# Patient Record
Sex: MALE | Race: BLACK OR AFRICAN AMERICAN
[De-identification: names, ages, dates, MRNs, and addresses within clinical notes are randomized per-mention and may not be internally consistent; named-entity substitution may affect disease eponyms.]

---

## 2018-05-08 ENCOUNTER — HOSPITAL ENCOUNTER (INPATIENT)
Dept: HOSPITAL 92 - ERS | Age: 78
LOS: 14 days | Discharge: HOME HEALTH | DRG: 673 | End: 2018-05-22
Attending: FAMILY MEDICINE | Admitting: FAMILY MEDICINE
Payer: COMMERCIAL

## 2018-05-08 VITALS — BODY MASS INDEX: 19.8 KG/M2

## 2018-05-08 DIAGNOSIS — I10: ICD-10-CM

## 2018-05-08 DIAGNOSIS — R65.20: ICD-10-CM

## 2018-05-08 DIAGNOSIS — K52.9: ICD-10-CM

## 2018-05-08 DIAGNOSIS — N17.9: ICD-10-CM

## 2018-05-08 DIAGNOSIS — B18.1: ICD-10-CM

## 2018-05-08 DIAGNOSIS — R53.81: ICD-10-CM

## 2018-05-08 DIAGNOSIS — F12.10: ICD-10-CM

## 2018-05-08 DIAGNOSIS — H54.7: ICD-10-CM

## 2018-05-08 DIAGNOSIS — K82.9: ICD-10-CM

## 2018-05-08 DIAGNOSIS — E43: ICD-10-CM

## 2018-05-08 DIAGNOSIS — Z86.73: ICD-10-CM

## 2018-05-08 DIAGNOSIS — T83.511A: Primary | ICD-10-CM

## 2018-05-08 DIAGNOSIS — I26.99: ICD-10-CM

## 2018-05-08 DIAGNOSIS — F10.10: ICD-10-CM

## 2018-05-08 DIAGNOSIS — A41.9: ICD-10-CM

## 2018-05-08 DIAGNOSIS — E83.51: ICD-10-CM

## 2018-05-08 DIAGNOSIS — E83.42: ICD-10-CM

## 2018-05-08 DIAGNOSIS — E87.6: ICD-10-CM

## 2018-05-08 DIAGNOSIS — Z74.01: ICD-10-CM

## 2018-05-08 DIAGNOSIS — K74.60: ICD-10-CM

## 2018-05-08 DIAGNOSIS — D62: ICD-10-CM

## 2018-05-08 DIAGNOSIS — B96.89: ICD-10-CM

## 2018-05-08 DIAGNOSIS — N40.0: ICD-10-CM

## 2018-05-08 DIAGNOSIS — N39.0: ICD-10-CM

## 2018-05-08 DIAGNOSIS — R74.0: ICD-10-CM

## 2018-05-08 DIAGNOSIS — D69.6: ICD-10-CM

## 2018-05-08 DIAGNOSIS — E46: ICD-10-CM

## 2018-05-08 DIAGNOSIS — J44.9: ICD-10-CM

## 2018-05-08 DIAGNOSIS — J96.11: ICD-10-CM

## 2018-05-08 DIAGNOSIS — I24.8: ICD-10-CM

## 2018-05-08 DIAGNOSIS — R60.9: ICD-10-CM

## 2018-05-08 DIAGNOSIS — K66.1: ICD-10-CM

## 2018-05-08 LAB
ALBUMIN SERPL BCG-MCNC: 2.4 G/DL (ref 3.4–4.8)
ALP SERPL-CCNC: 51 U/L (ref 40–150)
ALT SERPL W P-5'-P-CCNC: 80 U/L (ref 8–55)
ANION GAP SERPL CALC-SCNC: 14 MMOL/L (ref 10–20)
APTT PPP: 43.5 SEC (ref 22.9–36.1)
AST SERPL-CCNC: 544 U/L (ref 5–34)
BACTERIA UR QL AUTO: (no result) HPF
BILIRUB SERPL-MCNC: 1.6 MG/DL (ref 0.2–1.2)
BUN SERPL-MCNC: 17 MG/DL (ref 8.4–25.7)
CALCIUM SERPL-MCNC: 7 MG/DL (ref 7.8–10.44)
CHLORIDE SERPL-SCNC: 103 MMOL/L (ref 98–107)
CO2 SERPL-SCNC: 23 MMOL/L (ref 23–31)
CREAT CL PREDICTED SERPL C-G-VRATE: 0 ML/MIN (ref 70–130)
CRYSTAL-AUWI FLAG: 41.8 (ref 0–15)
GLOBULIN SER CALC-MCNC: 2.2 G/DL (ref 2.4–3.5)
GLUCOSE SERPL-MCNC: 65 MG/DL (ref 83–110)
HEP C INDEX: 0.2 S/CO (ref 0–0.79)
HEV IGM SER QL: 4.5 (ref 0–7.99)
HGB BLD-MCNC: 11.6 G/DL (ref 14–18)
HIV 1/2 INDEX: 0.16 S/CO (ref ?–1)
HYALINE CASTS #/AREA URNS LPF: (no result) LPF
INR PPP: 1.6
MACROCYTES BLD QL SMEAR: (no result) (100X)
MCH RBC QN AUTO: 38.4 PG (ref 27–31)
MCV RBC AUTO: 112 FL (ref 80–94)
MDIFF COMPLETE?: YES
PATHC CAST-AUWI FLAG: 0 (ref 0–2.49)
PLATELET # BLD AUTO: 60 THOU/UL (ref 130–400)
PLATELET BLD QL SMEAR: (no result)
POLYCHROMASIA BLD QL SMEAR: (no result) (100X)
POTASSIUM SERPL-SCNC: 2.9 MMOL/L (ref 3.5–5.1)
PROT UR STRIP.AUTO-MCNC: 100 MG/DL
PROTHROMBIN TIME: 19.2 SEC (ref 12–14.7)
RBC # BLD AUTO: 3.03 MILL/UL (ref 4.7–6.1)
SODIUM SERPL-SCNC: 137 MMOL/L (ref 136–145)
SP GR UR STRIP: 1.01 (ref 1–1.04)
SPERM-AUWI FLAG: 0 (ref 0–9.9)
SYPHILIS ANTIBODY INDEX: 0.08 S/CO
TROPONIN I SERPL DL<=0.01 NG/ML-MCNC: 0.28 NG/ML (ref ?–0.03)
WBC # BLD AUTO: 15.7 THOU/UL (ref 4.8–10.8)
YEAST-AUWI FLAG: 78.2 (ref 0–25)

## 2018-05-08 PROCEDURE — 86900 BLOOD TYPING SEROLOGIC ABO: CPT

## 2018-05-08 PROCEDURE — 76942 ECHO GUIDE FOR BIOPSY: CPT

## 2018-05-08 PROCEDURE — 87149 DNA/RNA DIRECT PROBE: CPT

## 2018-05-08 PROCEDURE — 86850 RBC ANTIBODY SCREEN: CPT

## 2018-05-08 PROCEDURE — 86780 TREPONEMA PALLIDUM: CPT

## 2018-05-08 PROCEDURE — 96366 THER/PROPH/DIAG IV INF ADDON: CPT

## 2018-05-08 PROCEDURE — 84300 ASSAY OF URINE SODIUM: CPT

## 2018-05-08 PROCEDURE — 74177 CT ABD & PELVIS W/CONTRAST: CPT

## 2018-05-08 PROCEDURE — 85018 HEMOGLOBIN: CPT

## 2018-05-08 PROCEDURE — 85025 COMPLETE CBC W/AUTO DIFF WBC: CPT

## 2018-05-08 PROCEDURE — 82747 ASSAY OF FOLIC ACID RBC: CPT

## 2018-05-08 PROCEDURE — 86706 HEP B SURFACE ANTIBODY: CPT

## 2018-05-08 PROCEDURE — 83735 ASSAY OF MAGNESIUM: CPT

## 2018-05-08 PROCEDURE — 85379 FIBRIN DEGRADATION QUANT: CPT

## 2018-05-08 PROCEDURE — 76705 ECHO EXAM OF ABDOMEN: CPT

## 2018-05-08 PROCEDURE — 86901 BLOOD TYPING SEROLOGIC RH(D): CPT

## 2018-05-08 PROCEDURE — 93306 TTE W/DOPPLER COMPLETE: CPT

## 2018-05-08 PROCEDURE — 96365 THER/PROPH/DIAG IV INF INIT: CPT

## 2018-05-08 PROCEDURE — 85049 AUTOMATED PLATELET COUNT: CPT

## 2018-05-08 PROCEDURE — 80053 COMPREHEN METABOLIC PANEL: CPT

## 2018-05-08 PROCEDURE — 82805 BLOOD GASES W/O2 SATURATION: CPT

## 2018-05-08 PROCEDURE — 36430 TRANSFUSION BLD/BLD COMPNT: CPT

## 2018-05-08 PROCEDURE — 83880 ASSAY OF NATRIURETIC PEPTIDE: CPT

## 2018-05-08 PROCEDURE — 74183 MRI ABD W/O CNTR FLWD CNTR: CPT

## 2018-05-08 PROCEDURE — C1769 GUIDE WIRE: HCPCS

## 2018-05-08 PROCEDURE — 86803 HEPATITIS C AB TEST: CPT

## 2018-05-08 PROCEDURE — 74176 CT ABD & PELVIS W/O CONTRAST: CPT

## 2018-05-08 PROCEDURE — 87521 HEPATITIS C PROBE&RVRS TRNSC: CPT

## 2018-05-08 PROCEDURE — 96361 HYDRATE IV INFUSION ADD-ON: CPT

## 2018-05-08 PROCEDURE — 93010 ELECTROCARDIOGRAM REPORT: CPT

## 2018-05-08 PROCEDURE — 82607 VITAMIN B-12: CPT

## 2018-05-08 PROCEDURE — 83605 ASSAY OF LACTIC ACID: CPT

## 2018-05-08 PROCEDURE — 93005 ELECTROCARDIOGRAM TRACING: CPT

## 2018-05-08 PROCEDURE — 86705 HEP B CORE ANTIBODY IGM: CPT

## 2018-05-08 PROCEDURE — 81001 URINALYSIS AUTO W/SCOPE: CPT

## 2018-05-08 PROCEDURE — 87077 CULTURE AEROBIC IDENTIFY: CPT

## 2018-05-08 PROCEDURE — 86708 HEPATITIS A ANTIBODY: CPT

## 2018-05-08 PROCEDURE — 36416 COLLJ CAPILLARY BLOOD SPEC: CPT

## 2018-05-08 PROCEDURE — 71045 X-RAY EXAM CHEST 1 VIEW: CPT

## 2018-05-08 PROCEDURE — 94640 AIRWAY INHALATION TREATMENT: CPT

## 2018-05-08 PROCEDURE — 87517 HEPATITIS B DNA QUANT: CPT

## 2018-05-08 PROCEDURE — P9016 RBC LEUKOCYTES REDUCED: HCPCS

## 2018-05-08 PROCEDURE — 82570 ASSAY OF URINE CREATININE: CPT

## 2018-05-08 PROCEDURE — 71275 CT ANGIOGRAPHY CHEST: CPT

## 2018-05-08 PROCEDURE — 80202 ASSAY OF VANCOMYCIN: CPT

## 2018-05-08 PROCEDURE — 84484 ASSAY OF TROPONIN QUANT: CPT

## 2018-05-08 PROCEDURE — 37191 INS ENDOVAS VENA CAVA FILTR: CPT

## 2018-05-08 PROCEDURE — 82553 CREATINE MB FRACTION: CPT

## 2018-05-08 PROCEDURE — 85014 HEMATOCRIT: CPT

## 2018-05-08 PROCEDURE — 87350 HEPATITIS BE AG IA: CPT

## 2018-05-08 PROCEDURE — 93970 EXTREMITY STUDY: CPT

## 2018-05-08 PROCEDURE — A4216 STERILE WATER/SALINE, 10 ML: HCPCS

## 2018-05-08 PROCEDURE — 86704 HEP B CORE ANTIBODY TOTAL: CPT

## 2018-05-08 PROCEDURE — 87040 BLOOD CULTURE FOR BACTERIA: CPT

## 2018-05-08 PROCEDURE — 85730 THROMBOPLASTIN TIME PARTIAL: CPT

## 2018-05-08 PROCEDURE — 96367 TX/PROPH/DG ADDL SEQ IV INF: CPT

## 2018-05-08 PROCEDURE — 85610 PROTHROMBIN TIME: CPT

## 2018-05-08 PROCEDURE — 87186 SC STD MICRODIL/AGAR DIL: CPT

## 2018-05-08 PROCEDURE — 36415 COLL VENOUS BLD VENIPUNCTURE: CPT

## 2018-05-08 PROCEDURE — 87389 HIV-1 AG W/HIV-1&-2 AB AG IA: CPT

## 2018-05-08 PROCEDURE — 87340 HEPATITIS B SURFACE AG IA: CPT

## 2018-05-08 PROCEDURE — 87086 URINE CULTURE/COLONY COUNT: CPT

## 2018-05-08 PROCEDURE — 80048 BASIC METABOLIC PNL TOTAL CA: CPT

## 2018-05-08 NOTE — RAD
SINGLE VIEW OF THE CHEST:

 

Comparison: None. 

 

History: Hematuria, fever. 

 

FINDINGS: 

Single view of the chest shows a normal sized cardiomediastinal silhouette with atherosclerotic calci
fications in the aorta. Hyperexpansion of the lungs may be secondary to COPD. There is no evidence of
 consolidation, mass or pleural effusion. 

 

IMPRESSION: 

No evidence of acute cardiopulmonary disease. 

 

POS: [facility]

## 2018-05-08 NOTE — CT
CT OF ABDOMEN AND PELVIS PERFORMED WITHOUT INTRAVENOUS CONTRAST ENHANCEMENT:

 

History: Hematuria, fever. 

 

FINDINGS: 

The lung bases show emphysematous change. The liver and spleen as well as pancreas and gallbladder re
gions appear fairly unremarkable given limitations of a noncontrast study. The gallbladder is slightl
y distended. Some increased attenuation of the gallbladder lumen could represent some minimal sludge 
or potentially tiny stones but this equivocal. 

 

The right and left adrenal glands and right and left kidneys are normal in size. There are no renal c
alculi or signs of obstruction. There is no significant periaortic adenopathy or obvious mesenteric a
denopathy. There is some mild fluid filled distention of the small bowel. There is fluid within the r
ight colon, air within the transverse colon. There is fat stranding which is fairly generalized but m
ore prominent in the region of the ascending colon. Some of the air seen within the right colon near 
the cecum region is potentially within the bowel wall. I cannot exclude pneumotosis in this region. 

 

CT OF PELVIS PERFORMED WITHOUT CONTRAST ENHANCEMENT:

The bladder is distended with a markedly enlarged prostate. There is fluid within the rectosigmoid re
gion. The bladder wall is thickened considering the degree of distention. There are arthritic changes
 of the spine and hips noted. 

 

IMPRESSION: 

1. Some mild generalized fat stranding within the fat, slightly more prominent in the ascending colon
 region. There is some air density which is along the right colon wall. I cannot exclude this as subt
le areas of pneumotosis and the possibility of a colitis would have to be considered in this patient.
 There is also some mild fluid filled distention of some of the small bowel. 

2. Distended bladder with an enlarged prostate. 

3. Mild gallbladder distention. 

4. Emphysematous lung change. 

 

Findings were discussed with Dr. Gomez.

 

POS: [facility]

## 2018-05-08 NOTE — PDOC.FPRHP
- History of Present Illness


Chief Complaint: Severe sepsis, hematuria


History of Present Illness: 





76 yo M w PMH of HTN, BPH and blindness presents for weakness, hematuria, fever 

and low BP found by ems. Pt reports hematuria by 2-3 days, noticed today by 

family and has associated weakness, decreased appetite and nausea and vomiting 

over last 3 days. Hematuria is associated with some urethral irritation; but he 

denies suprapubic pain and tenderness. Pt reports some soft stool over same 

time period WITHOUT associated abd pain, hematochezia, and BRBPR. Additionally 

reports some SOB. He has not had anything to eat for the last 2 days, but is a 

current everyday drinker of approx 1.5 liters of liquor per week. He denies 

focal weakness, fever, chills, sweats, cp, constipation, and abdominal pain. 

Per nurse and pts chart when ems arrived pt was found to be hypotensive, 

tachycardic and had an elevated temperature. On chart review pt had a recent 

urinalaysis and culture done in [place] which showed Myroides and 

Providentia spp in the urine that was sensitive to cipro. 





At baseline pt is non-ambulatory. He reports he has not been compliant with his 

medications and has not seen his PCP in some time. Last medication refills were 

nearly 1 year ago. He is currently not taking any medications.  











PCP: Michael





Code status: DNR


ED Course: 


4L NS, Vancomycin, Zosyn, 10 mEq K+ 





- Allergies/Adverse Reactions


 Allergies











Allergy/AdvReac Type Severity Reaction Status Date / Time


 


No Known Drug Allergies Allergy   Verified 05/08/18 23:48














- Home Medications


 











 Medication  Instructions  Recorded  Confirmed  Type


 


Aspirin 650 mg PO Q4HR PRN 05/08/18 05/08/18 History


 


Budesonide-Formoterol [Symbicort 1 puff INH BID 05/08/18 05/08/18 History





160-4.5]    


 


Finasteride [Proscar] 5 mg PO DAILY 05/08/18 05/08/18 History


 


Nebivolol HCl [Bystolic] 10 mg PO DAILY 05/08/18 05/08/18 History


 


Triamterene/Hydrochlorothiazid 1 cap PO DAILY 05/08/18 05/08/18 History





[Triamterene-Hctz 37.5-25 mg Cp]    


 


amLODIPine Bes/Olmesartan Med 1 each PO DAILY 05/08/18 05/08/18 History





[Pipo 5-20 mg Tablet]    











Comments: 





Pt has not taken medications for several months. 





- History


PMHx: HTN, BPH, COPD, HLD, hx CVA, alcohol abuse, former tobacco abuse, 

marijuana abuse, medication noncompliance


 


PSHx:Unknown 





FHx:NA


 


Social: Former smoker (45 pack-yr hx). Drinks 2 fifths of whiskey every week 

for many years. Smokes marijuana ~2x/wk for over 30 years. Denies other drug 

use. Lives at home with wife and brother.





- Review of Systems


General: reports: weight/appetite/sleep changes, fatigue.  denies: fever/chills

, night sweats


Eyes: reports: other (Blind at baseline).  denies: vision changes


ENT: denies: nasal congestion, rhinorrhea


Respiratory: reports: shortness of breath.  denies: cough, congestion


Cardiovascular: reports: edema.  denies: chest pain, palpitation


Gastrointestinal: reports: nausea, vomiting, diarrhea.  denies: constipation, 

abdominal pain, GI bleeding


Genitourinary: reports: dysuria, other (Hematuria, urinary retention)


Skin: denies: rashes, lesions


Musculoskeletal: denies: pain, swelling, arthritis/arthralgias


Neurological: reports: weakness.  denies: numbness, syncope





- Vital signs


BP: 88/65  HR: 122 RR: 22 Tmax: 100.4 Pox: 95% on RA  Wt: 63.5Kg   








- Physical Exam


Constitutional: awake, alert and oriented, other (Moderately distressed)


HEENT: normocephalic and atraumatic, no scleral icterus, grossly normal hearing

, MMM, oropharynx clear


Neck: supple, trachea midline, no LAD, no JVD, no thyromegaly


Chest: no-tender to palpation, no lesions


Heart: RRR, normal S1/S2, no murmurs/rubs/gallops, other (Distant heart sounds)


Lungs: CTAB, no respiratory distress, good air movement, no rales/rhonchi, no 

wheezing


Abdomen: soft, non-tender, bowel sounds present, no masses/distention, other (

Normal resonence)


Musculoskeletal: ROM grossly normal, other (wasting diffuse)


Neurological: no focal deficit


Skin: no rash/lesions, no jaundice


Heme/Lymphatic: no purpura, no petechia, no LAD, other (Hematuria)





FMR H&P: Results





- Labs


Result Diagrams: 


 05/09/18 03:45





 05/09/18 03:45


Lab results: 


 











WBC  15.7 thou/uL (4.8-10.8)  H  05/08/18  16:35    


 


Hgb  11.6 g/dL (14.0-18.0)  L  05/08/18  16:35    


 


Hct  34.1 % (42.0-52.0)  L  05/08/18  16:35    


 


MCV  112.0 fl (80.0-94.0)  H  05/08/18  16:35    


 


Plt Count  60 thou/uL (130-400)  L  05/08/18  16:35    


 


Band Neuts % (Manual)  34 % (5-11)  H  05/08/18  16:35    


 


Sodium  137 mmol/L (136-145)   05/08/18  16:35    


 


Potassium  2.9 mmol/L (3.5-5.1)  L*  05/08/18  16:35    


 


Chloride  103 mmol/L ()   05/08/18  16:35    


 


Carbon Dioxide  23 mmol/L (23-31)   05/08/18  16:35    


 


BUN  17 mg/dL (8.4-25.7)   05/08/18  16:35    


 


Creatinine  1.37 mg/dL (0.6-1.3)  H  05/08/18  16:35    


 


Glucose  65 mg/dL ()  L  05/08/18  16:35    


 


Lactic Acid  6.5 mmol/L (0.5-2.2)  H*  05/08/18  16:35    


 


Calcium  7.0 mg/dL (7.8-10.44)  L  05/08/18  16:35    


 


Total Bilirubin  1.6 mg/dL (0.2-1.2)  H  05/08/18  16:35    


 


AST  544 U/L (5-34)  H  05/08/18  16:35    


 


ALT  80 U/L (8-55)  H  05/08/18  16:35    


 


Alkaline Phosphatase  51 U/L ()   05/08/18  16:35    


 


Serum Total Protein  4.6 g/dL (5.8-8.1)  L  05/08/18  16:35    


 


Albumin  2.4 g/dL (3.4-4.8)  L  05/08/18  16:35    














- EKG Interpretation


EKG: 





NSR, low voltage, RRR





- Radiology Interpretation


  ** CT scan - abdomen


Status: report reviewed by me (Possible colitis, distended bladder, mildly 

distended gallbladder (equivocal))





  ** Chest x-ray


Status: report reviewed by me (No acute cardiothoracic process)





FMR H&P: A/P





- Problem List


(1) Severe sepsis


Current Visit: Yes   Status: Acute   Code(s): A41.9 - SEPSIS, UNSPECIFIED 

ORGANISM; R65.20 - SEVERE SEPSIS WITHOUT SEPTIC SHOCK   





(2) Hematuria


Current Visit: Yes   Status: Acute   Code(s): R31.9 - HEMATURIA, UNSPECIFIED   





(3) Colitis


Current Visit: Yes   Status: Suspected   Code(s): K52.9 - NONINFECTIVE 

GASTROENTERITIS AND COLITIS, UNSPECIFIED   





(4) UTI (urinary tract infection) due to urinary indwelling catheter


Current Visit: Yes   Status: Suspected   Code(s): T83.511A - I/I REACT D/T 

INDWELLING URETHRAL CATHETER, INIT; N39.0 - URINARY TRACT INFECTION, SITE NOT 

SPECIFIED   





(5) Acute kidney injury


Current Visit: Yes   Status: Suspected   Code(s): N17.9 - ACUTE KIDNEY FAILURE, 

UNSPECIFIED   





(6) Transaminitis


Current Visit: Yes   Status: Acute   Code(s): R74.0 - NONSPEC ELEV OF LEVELS OF 

TRANSAMNS & LACTIC ACID DEHYDRGNSE   





(7) Hypokalemia


Current Visit: Yes   Status: Acute   Code(s): E87.6 - HYPOKALEMIA   





(8) Hypocalcemia


Current Visit: Yes   Status: Acute   Code(s): E83.51 - HYPOCALCEMIA   





(9) Malnourished


Current Visit: Yes   Status: Chronic   Code(s): E46 - UNSPECIFIED PROTEIN-

CALORIE MALNUTRITION   





(10) Peripheral edema


Current Visit: Yes   Status: Chronic   Code(s): R60.9 - EDEMA, UNSPECIFIED   





(11) Alcohol abuse


Current Visit: Yes   Status: Chronic   Code(s): F10.10 - ALCOHOL ABUSE, 

UNCOMPLICATED   





(12) Drug abuse


Current Visit: Yes   Status: Chronic   Code(s): F19.10 - OTHER PSYCHOACTIVE 

SUBSTANCE ABUSE, UNCOMPLICATED   





(13) HTN (hypertension)


Current Visit: Yes   Status: Chronic   Code(s): I10 - ESSENTIAL (PRIMARY) 

HYPERTENSION   





(14) BPH (benign prostatic hyperplasia)


Current Visit: Yes   Status: Chronic   Code(s): N40.0 - BENIGN PROSTATIC 

HYPERPLASIA WITHOUT LOWER URINRY TRACT SYMP   





(15) Macrocytic anemia


Current Visit: Yes   Status: Acute   Code(s): D53.9 - NUTRITIONAL ANEMIA, 

UNSPECIFIED   





- Plan





1. Severe sepsis 2/2 suspected UTI vs colitis. BP 88/50s on admission and 

improved to 100s systolic with 4LNS. Lactic acid 6.5, WBC 15K. Pt to be 

admitted to [facility]. Likely 2/2 urosepsis vs colitis. CT abd showed possible 

colitis, distended bladder and mildly enlarged gallbladder. Will cover with 

broad spectrum abx including vanc, zosyn. Add cipro 2/2 recent urine culture 

which showed possible resistance to zosyn. Continue current bolus, will add D5 

+ LR w/ 40mEq Kcl. Monitor pressures, titrate fluids accordingly. Elevated LFTs 

and Creatinine, likely 2/2 hypotension and global hypo-perfusion. Check CK. 

Trend lactic acid. AM CBC and CMP, blood and urine culture pending. 


2. Gross hematuria. Urology consulted, appreciate recs. Recommended changing 

plaza. Will reassess with PVR after plaza has been changed. UA and culture are 

pending. Given most recent culture done on 4/23, will cover for those organisms 

with cipro, vanc and zosyn. 


4. Colitis, possible. Empiric coverage, IV zosyn, vanc and cipro. 


3. UTI, suspected. See above. 


5. YEHUDA vs CKD. Creatinine 1.37, unknown baseline. Repeat am CMP. Continue IVF. 


6. Hypokalemia. 2.9 on admission labs. No EKG changes. Cont D5 LR w/ KCl


7. Transaminitis. alcohol abuse vs hypoperfusion vs rhabdo. AM CMP.


8. Alcohol abuse. Approx 1.5L liquor per week. ASE protocol. IV thiamine and 

multivitamin. Trend LFTs.  on cessation. Check B12 and RBC folate.


9. Peripheral edema. Check BNP.


10. Hypocalcemia. Corrects to normal range (8.3) 


11. BPH. Await urology recs. Supposed to be on finasteride but not taking. Sees 

Dr. Oliveira outpatient. Bladder distended and thickened on CT.


12. HTN. Monitor BPs as sepsis resolves and start oral meds prn. Not compliant 

at home.


13. Marijuana abuse. Counseling. Smokes regularly for over 30 years.


14. Deconditioning. Endorses not walking for last 6 months due to weakness. Pt 

is blind but appears to have been ambulatory until recently. PT/OT evaluation.


15. Macrocytic anemia. Likely related to alcohol abuse. Check B12 and folate. 

CBC in AM.


16. PPX: SCDs and Pepcid for DVT and GI ppx, respectively. 


17. Code status: Pt wishes to be DNR. Spoke with pt regarding options and he 

still wishes to be DNR. 


Disposition/LOS: 





LOS:>/= 2 days. Dispo: Guarded. 





FMR H&P: [place]





- Pertinent history


76 yo AAM with PMHx HTN, likely BPH, alcohol abuse, and medication 

noncompliance presented via EMS from home due to hematuria, fever, and 

hypotension. Pt currently requires indwelling plaza and is under care of Dr. Oliveira (urology) for assumed BPH. Blood has been present in plaza bag for 

last few days. Catheter changed last PM by HH nurse and saw more blood and some 

difficulty initially getting urine to drain. This change was also more painful 

than normal. Hematuria continued today along with fever 101 prompting EMS call. 

EMS found him lying on his couch with BP 80s/50s, HR 120s, and temp 100.4. He 

was then brought to the hospital. Pt lives at home with wife and brother. 

Apparently does not take any of his meds by choice. He is blind. Has not walked 

in over 6 months due to subjective weakness in legs. Drinks 2 fifths of whiskey 

per week. Endorses mild cough and loose stool yesterday. One episode of 

vomiting last night. No abd pain. Family not present for interview. 





- Pertinent findings


Gen: thin, alert, NAD


HEENT: poor dentition, MMM


Lungs: CTAB, no increased WOB


CV: Distant heart sounds, RRR, no obvious m/r/g although difficult to auscultate


Abd: NT/ND, no rebound or guarding, no organomegaly, abd wall muscles mildly 

tense diffusely although no pain and attempting to relax for exam


Ext: 3+ pitting BLE edema distal 1/3rd of shins and feet/ankles


Skin: no visualized lesions, capillary refill <2 sec


: indwelling catheter in place with williams hematuria in bag


Psych: A&Ox4, distant and recent memory fair, answers questions appropriately





- Plan


Date/Time: 05/08/18 1907





1. Severe sepsis 2/2 suspected UTI vs colitis. WBC 15.7 with 34% bands, 

tachycardic to 122, and fever 100.4. Hypotensive with lactate 6.5. After 4L NS, 

pts hypotension and tachycardia improved. Pt has indwelling plaza which is 

nidus for infection. Likely somewhat immunocompromised 2/2 heavy alcohol abuse. 

Still awaiting UA as collected late in ED after abx but had semi-resistant 

organisms on UCx from 4/23/18. CT abd showed possible early colitis findings, 

distended bladder, and mildly distended gallbladder. Start broad spectrum abx. 

Added Cipro due to UCx 2 wks ago that showed semi-resistant organism to Zosyn. 

Repeat lactate in 4 hours. Check trops. Other potential signs of end-organ 

damage include transaminitis and elevated Cr. Continue IV fluids and will 

monitor closely in IMCU. Admit for expected 2 day stay.


2. Gross hematuria. Consulted urology who recommended changing out plaza and 

monitoring overnight. Suspected blood clot in tubing. No irrigation overnight 

at this time. Pt has thrombocytopenia likely 2/2 heavy alcohol abuse. Await 

coags. H/H appears stable but repeat in AM.


4. Colitis, possible. Coverage with broad spectrum to cover for infectious 

causes although hx not consistent. Findings on CT as above. Monitor closely for 

evolution of symptoms.


3. UTI, suspected. See above. Urine results not available but this is highly 

likely due to indwelling catheter. Continue abx and await urine testing 

including culture.


5. YEHUDA vs CKD. Unknown baseline but GFR 61 with Cr 1.37. Heavy fluids and 

expect some improvement overnight. Repeat CMP in AM.


6. Hypokalemia. 2.9 on admission labs. Pending EKG. IV K given in ED and will 

add to IV fluids. Recheck in AM.


7. Transaminitis. Suspect 2/2 heavy alcohol abuse and hypoperfusion. Monitor 

CMP in AM.


8. Alcohol abuse. Heavy use - 2 fifths of whiskey per week. ASE protocol. 

Suspect poor nutritional labs 2/2 this. May be immunocompromised from this as 

well. Macrocytic anemia suspected related. Will give thiamine IV for 3 days 

then transition to PO. Multivitamin. Dietary consult as appears malnourished. 

Screen for HIV, hepatitis, and syphilis.  on cessation.


9. Peripheral edema. Check BNP. Pt denies hx CHF although poorly compliant with 

medical care.


10. Hypocalcemia. Corrects to normal range (8.3) for low albumin.


11. BPH. Await urology recs. Supposed to be on finasteride but not taking. Sees 

Dr. Oliveira outpatient. Bladder distended and thickened on CT.


12. HTN. Monitor BPs as sepsis resolves and start oral meds prn. Not compliant 

at home.


13. Marijuana abuse. Counseling. Smokes regularly for over 30 years.


14. Deconditioning. Endorses not walking for last 6 months due to weakness. Pt 

is blind but appears to have been ambulatory until recently. PT/OT evaluation.


15. Macrocytic anemia. Likely related to alcohol abuse. Check B12 and folate. 

CBC in AM.





I, Brandon Marino, have evaluated this patient and agree with findings/plan 

as outlined by intern resident. Pertinent changes/additions are listed here.





Attending Addendum





- Attending Addendum


Date/Time: 05/09/18 [id number]





I personally evaluated the patient and discussed the management with Dr. Christina 

and Dr. Marino


I agree with the History, Examination, Assessment and Plan documented above 

with any addition or exceptions noted below.





76 yo male with multiple medical problems admitted for severe sepsis 


Reports fatigue, weakness, and decrease appetite. Hematuria 5/7/18. Plaza 

recently changed on 5/7/18. EMS contacted. Patient found to be hypotensive and 

febrile on arrival. 


Septic shock: Resolved at present. No pressers needed. Admit to IMCU overnight 

due to borderline pressures on monitoring in the ER low 100s. Rare SBP in 90s. 

YEHUDA on labs. Continue IVF bolus prn. Now s/p 4L. Will start with maintenance 

and adjust as needed. 


Severe sepsis: Significant lactic acidosis. Trend labs. Source appears to be 

urinary but concern for colitis as well. Broad coverage at present until able 

to de-escalate for cultures and imaging.


Gross hematuria: Unsure etiology. Possible trauma related/infection/

thrombocytopenia/or other pathology. Will replace plaza. Urology to evaluate in 

AM. Continue to flush to make sure not clots form. Bladder distended on CT. 


YEHUDA: Improving with hydration. Trend labs. CKD at baseline. Renal dose 

medications. 


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 Place on ASE protocol. Trend 

labs and coags. Monitor for other mucousal bleeding (GI) as well due to 

thrombocytopenia. Monitor closely as well as caution with drug metabolites. 





Continue to monitor chronic condition and adjust medications as needed. 





James

## 2018-05-09 LAB
ALBUMIN SERPL BCG-MCNC: 2.3 G/DL (ref 3.4–4.8)
ALP SERPL-CCNC: 49 U/L (ref 40–150)
ALT SERPL W P-5'-P-CCNC: 112 U/L (ref 8–55)
ANION GAP SERPL CALC-SCNC: 9 MMOL/L (ref 10–20)
AST SERPL-CCNC: 450 U/L (ref 5–34)
BILIRUB SERPL-MCNC: 1.1 MG/DL (ref 0.2–1.2)
BUN SERPL-MCNC: 20 MG/DL (ref 8.4–25.7)
CALCIUM SERPL-MCNC: 6.7 MG/DL (ref 7.8–10.44)
CHLORIDE SERPL-SCNC: 106 MMOL/L (ref 98–107)
CO2 SERPL-SCNC: 25 MMOL/L (ref 23–31)
CREAT CL PREDICTED SERPL C-G-VRATE: 44 ML/MIN (ref 70–130)
GLOBULIN SER CALC-MCNC: 2.1 G/DL (ref 2.4–3.5)
GLUCOSE SERPL-MCNC: 121 MG/DL (ref 83–110)
HBCM INDEX: 0.07 S/CO (ref 0–0.79)
HBSAG INDEX: 6081.62 S/CO (ref 0–0.99)
HBV SURFACE AB SERPL IA-ACNC: 0 MIU/ML
HEP B CORE TOTAL INDEX: 11.52 S/CO (ref 0–0.79)
HGB BLD-MCNC: 12.6 G/DL (ref 14–18)
MCH RBC QN AUTO: 39.4 PG (ref 27–31)
MCV RBC AUTO: 112 FL (ref 80–94)
MDIFF COMPLETE?: YES
PLATELET # BLD AUTO: 57 THOU/UL (ref 130–400)
PLATELET BLD QL SMEAR: (no result)
POTASSIUM SERPL-SCNC: 3.1 MMOL/L (ref 3.5–5.1)
RBC # BLD AUTO: 3.2 MILL/UL (ref 4.7–6.1)
RBC UR QL AUTO: (no result) HPF (ref 0–3)
SODIUM SERPL-SCNC: 137 MMOL/L (ref 136–145)
TROPONIN I SERPL DL<=0.01 NG/ML-MCNC: 0.21 NG/ML (ref ?–0.03)
TROPONIN I SERPL DL<=0.01 NG/ML-MCNC: 0.24 NG/ML (ref ?–0.03)
WBC # BLD AUTO: 13.9 THOU/UL (ref 4.8–10.8)

## 2018-05-09 RX ADMIN — Medication SCH: at 20:59

## 2018-05-09 RX ADMIN — VANCOMYCIN HYDROCHLORIDE SCH MLS: 1 INJECTION, SOLUTION INTRAVENOUS at 17:03

## 2018-05-09 RX ADMIN — POTASSIUM CHLORIDE SCH MEQ: 14.9 INJECTION, SOLUTION INTRAVENOUS at 12:42

## 2018-05-09 RX ADMIN — FAMOTIDINE SCH MG: 10 INJECTION, SOLUTION INTRAVENOUS at 21:46

## 2018-05-09 RX ADMIN — MOMETASONE FUROATE AND FORMOTEROL FUMARATE DIHYDRATE SCH PUFF: 200; 5 AEROSOL RESPIRATORY (INHALATION) at 13:33

## 2018-05-09 RX ADMIN — Medication SCH: at 08:16

## 2018-05-09 RX ADMIN — POTASSIUM CHLORIDE SCH MEQ: 14.9 INJECTION, SOLUTION INTRAVENOUS at 08:10

## 2018-05-09 NOTE — PDOC.FM
- Subjective


Subjective: 





Patient states he is feeling good except for burning in his arm from potassium 

infusion. He reports that he never felt bad prior to coming in and was only 

encouraged to come by bother and wife due to the blood in urine. He reports 

being unable to walk for nearly the last 6-8 months. He also reports loosing 

his vision for that same amount of time. Does not know why he lost his vision 

but reports he had been seeing eye doctor and getting drops but gradually 

vision got worse and now blind. He reports an episode of loose stools yesterday 

per his brother. His brother brings him food, changes him, bathes him, and 

takes him to doctor's appointments occasionally. He otherwise reports staying 

on the couch all day bc he cannot walk. He states he just feels weak in his 

legs. Reports LE swelling for several months. He does not report SOB while 

laying flat but in general reports SOB. 


He reports daily drinking recently but prior to that he could go days without 

drinking. He denies agitation, tremors, restlessness. He denies chest pain. 





- Objective


MAR Reviewed: Yes


Vital Signs & Weight: 


 Vital Signs (12 hours)











  Temp Pulse Resp BP Pulse Ox


 


 05/09/18 07:19  98.4 F  87  20  98/70  100


 


 05/09/18 04:22  98.3 F  89  18  94/65  100


 


 05/09/18 04:16      96


 


 05/08/18 23:54  98.0 F  86   114/70  96


 


 05/08/18 22:17      96








 Weight











Admit Weight                   56.812 kg


 


Weight                         58.23 kg














Result Diagrams: 


 05/09/18 03:45





 05/09/18 03:45





<Shobha Cuellar - Last Filed: 05/09/18 11:08>





- Objective


Vital Signs & Weight: 


 Vital Signs (12 hours)











  Temp Pulse Resp BP Pulse Ox


 


 05/09/18 11:30  98.6 F  110 H  23 H  103/73  100


 


 05/09/18 08:00  98.4 F  87  20  


 


 05/09/18 07:19  98.4 F  87  20  98/70  100


 


 05/09/18 04:22  98.3 F  89  18  94/65  100


 


 05/09/18 04:16      96


 


 05/08/18 23:54  98.0 F  86   114/70  96








 Weight











Admit Weight                   56.812 kg


 


Weight                         58.23 kg














Result Diagrams: 


 05/09/18 03:45





 05/09/18 03:45





<Jessie Davidson - Last Filed: 05/09/18 12:00>





Phys Exam





- Physical Examination


Constitutional: NAD


lying flat in bed, conversing easily


Respiratory: no wheezing, no rales, no rhonchi


decreased breath sounds BLL


Cardiovascular: RRR


very faint distant heart sounds, no murmurs noted 


Gastrointestinal: soft, non-tender, no distention


3+ pitting edema to mid calf/shin bilaterally 


Neurological: non-focal


strength 5/5 in BLE, sensation intact


Psychiatric: normal affect, A&O x 3





<Shobha Cuellar - Last Filed: 05/09/18 11:08>





Dx/Plan


(1) Macrocytic anemia


Code(s): D53.9 - NUTRITIONAL ANEMIA, UNSPECIFIED   Status: Acute   





(2) Severe sepsis


Code(s): A41.9 - SEPSIS, UNSPECIFIED ORGANISM; R65.20 - SEVERE SEPSIS WITHOUT 

SEPTIC SHOCK   Status: Acute   





(3) Transaminitis


Code(s): R74.0 - NONSPEC ELEV OF LEVELS OF TRANSAMNS & LACTIC ACID DEHYDRGNSE   

Status: Acute   





(4) Alcohol abuse


Code(s): F10.10 - ALCOHOL ABUSE, UNCOMPLICATED   Status: Chronic   





(5) BPH (benign prostatic hyperplasia)


Code(s): N40.0 - BENIGN PROSTATIC HYPERPLASIA WITHOUT LOWER URINRY TRACT SYMP   

Status: Chronic   





(6) HTN (hypertension)


Code(s): I10 - ESSENTIAL (PRIMARY) HYPERTENSION   Status: Chronic   





(7) Malnourished


Code(s): E46 - UNSPECIFIED PROTEIN-CALORIE MALNUTRITION   Status: Chronic   





(8) Peripheral edema


Code(s): R60.9 - EDEMA, UNSPECIFIED   Status: Chronic   





(9) Acute kidney injury


Code(s): N17.9 - ACUTE KIDNEY FAILURE, UNSPECIFIED   Status: Suspected   





(10) UTI (urinary tract infection) due to urinary indwelling catheter


Code(s): T83.511A - I/I REACT D/T INDWELLING URETHRAL CATHETER, INIT; N39.0 - 

URINARY TRACT INFECTION, SITE NOT SPECIFIED   Status: Suspected   





- Plan


Plan: 





77 yr old male with PMH of BPH, blindness, HTN presents for hematuria. 





1. severe sepsis with gram negative bacteremia likely 2/2 UTI from chronic 

indwelling catheter- Pt is on broad spectrum abx. Cipro has been added since 

his last UTI (< 1 month ago) revealed myroides which was only sensitive to cipro

/levaquin. He is now s/p > 5 lts fluid and did not require pressors. cont 

fluids. Afebrile and HR nml. Improving and will send to medical floor. 





2. UTI


-by UA


-awaiting cx


-see #1 





3. BPH


-urology consulted, appreciate recs 





4. hematuria


- suspect 2/2 # 2 and/or 3


-appears to be resolving


-low RBC on UA 





5. transaminitis likely 2/2 chronic Hep B infection and alcohol abuse


- cont to monitor LFTs


-INR wnl


-low platelets- cont to monitor


- will obtain RUQ sono





6. chronic Hepatitis B infection


-obtain Hep B DNA


-RUQ sono





7. suspected heart failure


-obtain ECHO


-BNP > 700





8. macrocytic anemia


-pending folate


-B12 wnl





9. alcohol abuse


- cont on ASE protocol


-daily multivitamin, thiamine, folic acid 





10. Acute kidney injury


-improved with fluids 


-monitor UOP 





11. blindness


-suspect macular degeneration but will request records from Dr. Hull 


-Will have CM see patient for resources 





12. deconditioning


-consult PT 





Dispo- Will require at least another 48 hours of hospitalization but pt is 

considering a SNU at discharge. Will have CM consulted for dispo planning.  





<Shobha Cuellar - Last Filed: 05/09/18 11:08>





Attending Addendum





- Attending Addendum


Date/Time: 05/09/18 8626





I personally evaluated the patient and discussed the management with Dr. Cuellar


I agree with the History, Examination, Assessment and Plan documented above 

with any addition or exceptions noted below- Patient without complaints. 

Afebrile VSS. A/P: 1) Sepsis secondry to UTI- continue IV antibtiotics. Patient 

has chronic in-dwelling plaza which has been changed out. Await urine and blood 

cultures. Tachycardia and hypotension resolved. 2) Chronic hep B- check viral 

load. 3) Deconditioning- continue PT. 4) Alcohol use- continue MVI and 

thiamine. 








<Jessie Davidson - Last Filed: 05/09/18 12:00>

## 2018-05-09 NOTE — PQF
Date:     5-9-18                                                                
  ATTN:  DR. MARVIN MILAN



Please exercise your independent, professional judgment in responding to the 
clarification form. 

Clinical indicators are provided on the bottom of this form for your review



Please check appropriate box(s):

[ x ] Protein Calorie Malnutrition:    [  ] Mild      [  ] Moderate   [ x ] 
Severe   

[  ] Cachexia 

[  ] Other diagnosis ___________

[  ] Unable to determine



In addition, please specify:

Present on Admission (POA):  [ x ] Yes             [  ] No             [  ] 
Unable to determine



CLINICAL INDICATORS - SIGNS / SYMPTOMS / LABS



BMI of   17.4



ALBUMIN:  5-8-18:   2.4

                  5-9-18:   2.3



H&P:   WEAKNESS,  DECREASED APPETITE AND NAUSEA, AND VOMITING OVER LAST 3 DAYS, 
PERIPHERAL EDEMA



H&P:  CHRONIC MALNOURISHED UNSPECIFIED PROTEIN CALORIE



RISK FACTORS: H&P:   WEAKNESS,  DECREASED APPETITE AND NAUSEA, AND VOMITING 
OVER LAST 3 DAYS, PERIPHERAL EDEMA,

                                     DRINKS LIQUOR, HX OF COPD,  HX OF CVA,  HX 
OF HYPERLIPIDEMIA,  DECONDITIONING, FORMER SMOKER, 

                                     MARIJUANA ABUSE

  

TREATMENT:   DIETARY CONSULT 5-9-18

                          (MAR)  THERAGRAN



Moderate Malnutrition (in acute illness)

Energy Intake: <75% of estimated energy requirement for > 7 days

Weight Loss:  1-2%/1 week;  5%/ 1 month; 7.5%/3 months

Other: mild body fat loss; mild muscle mass loss; mild fluid accumulation; 

Severe Malnutrition (in acute illness)

Energy Intake: < 50% of estimated energy requirement for > 5 days

Weight Loss: >1-2%/1 week; >5%/1 month; >7.5%/3 months

Other: moderate body fat loss; moderate muscle mass loss; moderate- severe 
fluid accumulation; measurably reduced  strength

Moderate Malnutrition (in chronic illness)

Energy Intake: <75% of estimated energy requirement for >1 month

Weight Loss: 5%/1 month; 7.5%/3 months; 10%/6 months; 20%/1 year

Other: mild body fat loss; mild muscle mass loss; mild fluid accumulation

Severe Malnutrition (in chronic illness)

Energy Intake: <75% of estimated energy requirement for >1 month

Weight Loss: >5%/1 month; >7.5%/3 months; >10%/6 months; >20%/1 year

Other: severe body fat loss; severe muscle mass loss; severe fluid accumulation
; measurably reduced  strength



(This form is maintained as a part of the permanent medical record)

 2015 [employer].  All Rights Reserved

[email or web address]    Office:  [phone]

                                                              

 

[facility]

## 2018-05-09 NOTE — CON
DATE OF CONSULTATION:  05/09/2018

 

CHIEF COMPLAINT:  Gross hematuria.

 

HISTORY:  Mr. Esteves is a 77-year-old gentleman admitted to the hospital for 
gross hematuria along with weakness.  The history is obtained from the chart 
and from the patient.  His urologic history dates back several months ago when 
he developed urinary retention.  He has had a Burgess catheter in place since 
then.  He has been evaluated by Dr. Oliveira from a urologic standpoint.  
According to the patient, the plan at this time is chronic indwelling Burgess 
catheter with a catheter change being performed approximately every month.  He 
was doing well until recently when he had his catheter changed, he developed 
gross hematuria.  He was brought to the emergency room for further evaluation.  
He denies any pain.  He does have some diminished appetite and some weakness.  
Denies fevers or chills.  It should be noted that urine culture was performed 
recently and demonstrated Myroides and Providencia, sensitive to CIPRO.  Since 
admission and since hydration, his urine has cleared and there is no known 
visible gross hematuria.

 

Patient has a history of alcohol use.  He has been nonambulatory for several 
months apparently after a couple of falls and he blames his inability to 
ambulate on his left knee.  He has blindness.

 

PAST MEDICAL HISTORY:  Blindness, alcoholism.  He is nonambulatory, urinary 
retention, hypertension, history of CVA.

 

MEDICATIONS:  Patient is not taking medication at this time, although 
prescriptions include Proscar, triamterene, hydrochlorothiazide, amlodipine, 
aspirin.

 

ALLERGIES:  No known drug allergies.

 

REVIEW OF SYSTEMS:  Patient does have some loss of appetite.  Denies fevers or 
chills.  Visual:  He is blind.  Denies any recent changes.  Respiratory:  
Denies cough or hemoptysis.  Cardiovascular:  Denies chest pain or 
palpitations.  Gastrointestinal:  Denies chronic constipation or diarrhea.

 

PHYSICAL EXAMINATION:

GENERAL:  He is awake and alert.  He is in no distress at this time.

HEENT:  Normocephalic, atraumatic.

NECK:  No masses.

CHEST:  Clear to auscultation.

CARDIOVASCULAR:  No murmurs auscultated.

ABDOMEN:  Soft, nontender, no peritoneal signs.

 

LABORATORY DATA:  White blood cell count 13.9, creatinine 1.16.  A CT scan 
demonstrates no upper urinary tract abnormalities.  The bladder was distended 
when the CT was performed.  The catheter has since been changed.

 

IMPRESSION:  Gross hematuria most likely related to recent urinary tract 
infection and possibly catheter trauma.  The urine has cleared with hydration 
and antibiotic therapy.  There is no upper urinary tract source for the 
hematuria and that there are no stones or tumors noted on the CT scan.  I am 
unsure whether he has had cystoscopy or not, we will need to obtain his records 
from Dr. Oliveira.

 

RECOMMENDATIONS:

1.  Agree with cultures and Cipro at this time.

2.  Cystoscopy may be needed in the future if it has not been recently 
performed by Dr. Oliveira.

3.  No further urologic recommendations at this time.

 

[facility]

## 2018-05-09 NOTE — PQF
DATE:     5-9-18                                                           ATTN
:  DR. MARVIN MILAN



Please exercise your independent, professional judgment in responding to the 
clarification form. 

Clinical indicators are provided on the bottom of this form for your review



Please check appropriate box(s):



[ x ] suspect Demand Ischemia

[  ]  MI Type (__________________)

[  ]  Other

[x  ] Unable to determine

                              

In addition, please specify:

Present on Admission (POA):  [x  ] Yes             [  ] No             [  ] 
Unable to determine





CLINICAL INDICATORS - SIGNS / SYMPTOMS / LABS



H&P:   HTN,  COPD,  HYPERLIPIDEMIA,  HX OF CVA,  ALCOHOL ABUSE,  FORMER TOBACCO 
ABUSE,  MARIJUANA ABUSE, 

           MEDICATION NONCOMPLIANCE



TROPONIN:    5-8-18:   0.276

                      5-9-18:   0.215

                      5-9-18:   0.243



H&P:   PRESENTS WITH WEAKNESS, FEVER, LOW BP, HEMATURIA, DECREASED APPETITE AND 
NAUSEAS AND 

          VOMITING OVER LAST 3 DAYS



RISKS:   H&P:   HTN,  COPD,  HYPERLIPIDEMIA,  HX OF CVA,  ALCOHOL ABUSE,  
FORMER TOBACCO ABUSE, 

              MARIJUANA ABUSE, MEDICATION NONCOMPLIANCE



TREATMENTS:   (ER) 02 2L

                          CONTINUOUS CARDIAC MONITORING

                          ECHO ORDERED



(This form is maintained as a part of the permanent medical record)

 2015 [id number].  All Rights Reserved

[email or web address]    Office:  [phone]

                                                              

JOHN

## 2018-05-10 LAB
ALBUMIN SERPL BCG-MCNC: 2.2 G/DL (ref 3.4–4.8)
ALP SERPL-CCNC: 48 U/L (ref 40–150)
ALT SERPL W P-5'-P-CCNC: 75 U/L (ref 8–55)
ANION GAP SERPL CALC-SCNC: 7 MMOL/L (ref 10–20)
AST SERPL-CCNC: 144 U/L (ref 5–34)
BILIRUB SERPL-MCNC: 0.9 MG/DL (ref 0.2–1.2)
BUN SERPL-MCNC: 22 MG/DL (ref 8.4–25.7)
CALCIUM SERPL-MCNC: 7.2 MG/DL (ref 7.8–10.44)
CHLORIDE SERPL-SCNC: 106 MMOL/L (ref 98–107)
CO2 SERPL-SCNC: 26 MMOL/L (ref 23–31)
CREAT CL PREDICTED SERPL C-G-VRATE: 51 ML/MIN (ref 70–130)
FOLATE RBC-MCNC: 643 NG/ML (ref 498–?)
GLOBULIN SER CALC-MCNC: 2.1 G/DL (ref 2.4–3.5)
GLUCOSE SERPL-MCNC: 124 MG/DL (ref 83–110)
HCT VFR BLD CALC: 34.8 % (ref 37.5–51)
HGB BLD-MCNC: 12 G/DL (ref 14–18)
MACROCYTES BLD QL SMEAR: (no result) (100X)
MCH RBC QN AUTO: 40.7 PG (ref 27–31)
MCV RBC AUTO: 113 FL (ref 80–94)
MDIFF COMPLETE?: YES
PLATELET # BLD AUTO: 45 THOU/UL (ref 130–400)
PLATELET BLD QL SMEAR: (no result)
POTASSIUM SERPL-SCNC: 3.6 MMOL/L (ref 3.5–5.1)
RBC # BLD AUTO: 2.94 MILL/UL (ref 4.7–6.1)
SODIUM SERPL-SCNC: 135 MMOL/L (ref 136–145)
VANCOMYCIN TROUGH SERPL-MCNC: 9.4 UG/ML
WBC # BLD AUTO: 8.6 THOU/UL (ref 4.8–10.8)

## 2018-05-10 RX ADMIN — VANCOMYCIN HYDROCHLORIDE SCH MLS: 1 INJECTION, SOLUTION INTRAVENOUS at 16:47

## 2018-05-10 RX ADMIN — MOMETASONE FUROATE AND FORMOTEROL FUMARATE DIHYDRATE SCH: 200; 5 AEROSOL RESPIRATORY (INHALATION) at 07:46

## 2018-05-10 RX ADMIN — FAMOTIDINE SCH MG: 10 INJECTION, SOLUTION INTRAVENOUS at 22:00

## 2018-05-10 RX ADMIN — Medication SCH ML: at 08:29

## 2018-05-10 RX ADMIN — Medication SCH: at 22:01

## 2018-05-10 RX ADMIN — ASCORBIC ACID, VITAMIN A PALMITATE, CHOLECALCIFEROL, THIAMINE HYDROCHLORIDE, RIBOFLAVIN-5 PHOSPHATE SODIUM, PYRIDOXINE HYDROCHLORIDE, NIACINAMIDE, DEXPANTHENOL, ALPHA-TOCOPHEROL ACETATE, VITAMIN K1, FOLIC ACID, BIOTIN, CYANOCOBALAMIN SCH MLS: 200; 3300; 200; 6; 3.6; 6; 40; 15; 10; 150; 600; 60; 5 INJECTION, SOLUTION INTRAVENOUS at 08:23

## 2018-05-10 RX ADMIN — MOMETASONE FUROATE AND FORMOTEROL FUMARATE DIHYDRATE SCH PUFF: 200; 5 AEROSOL RESPIRATORY (INHALATION) at 18:47

## 2018-05-10 NOTE — RAD
LEFT KNEE FOUR VIEWS:

5/10/18

 

HISTORY: 

Chronic knee pain. 

 

There are marked arthritic changes of the knee. There is severe medial compartment narrowing. Lesser 
changes of the lateral and patellofemoral compartments are seen. There is ossified bodies seen poster
iorly felt to be in the posterior joint space. The bones appear somewhat demineralized. No acute proc
ess. 

 

IMPRESSION:  

Severe arthritic changes of the knee. 

 

POS: [facility]

## 2018-05-10 NOTE — PDOC.FM
- Subjective


Subjective: 





Patient feeling good this AM. He has no complaints. He declined PT due to knee 

pain. He has not had his knee evaluated for the pain and instead states he 

thought in time the knee would get better. He is tolerating PO. No acute events 

overnight. 





- Objective


MAR Reviewed: Yes


Vital Signs & Weight: 


 Vital Signs (12 hours)











  Temp Pulse Resp BP Pulse Ox


 


 05/10/18 04:00  98.1 F  73  20  118/79  100


 


 05/10/18 00:00  98.9 F  78  20  119/81  98


 


 05/09/18 20:00  97.8 F  83  20   100


 


 05/09/18 19:53  97.8 F  83  20  131/84  100








 Weight











Admit Weight                   56.812 kg


 


Weight                         58.4 kg














I&O: 


 











 05/08/18 05/09/18 05/10/18





 06:59 06:59 06:59


 


Intake Total   650


 


Output Total   530


 


Balance   120











Result Diagrams: 


 05/10/18 03:25





 05/10/18 03:25





<Shobha Cuellar - Last Filed: 05/10/18 10:07>





- Objective


Vital Signs & Weight: 


 Vital Signs (12 hours)











  Temp Pulse Resp BP BP Pulse Ox


 


 05/10/18 18:47   78  16    99


 


 05/10/18 16:00  98.2 F  78  20   112/79  99


 


 05/10/18 11:00  98.3 F  83  20  119/78   100








 Weight











Admit Weight                   56.812 kg


 


Weight                         58.4 kg














I&O: 


 











 05/09/18 05/10/18 05/11/18





 06:59 06:59 06:59


 


Intake Total  650 2040


 


Output Total  530 275


 


Balance  120 1765











Result Diagrams: 


 05/10/18 03:25





 05/10/18 03:25





<Jessie Davidson - Last Filed: 05/10/18 21:27>





Phys Exam





- Physical Examination


Constitutional: NAD


Respiratory: no wheezing, no rales, no rhonchi, clear to auscultation bilateral


distant breath sounds, poor inspiratory effort. 


Cardiovascular: RRR


distant faint heart sounds. 


Gastrointestinal: soft, non-tender, no distention


1+ pitting bilateral LE edema 


Neurological: non-focal, moves all 4 limbs


Psychiatric: normal affect, A&O x 3





<Shobha Cuellar - Last Filed: 05/10/18 10:07>





Dx/Plan


(1) Severe sepsis


Code(s): A41.9 - SEPSIS, UNSPECIFIED ORGANISM; R65.20 - SEVERE SEPSIS WITHOUT 

SEPTIC SHOCK   Status: Resolved   





(2) UTI (urinary tract infection) due to urinary indwelling catheter


Code(s): T83.511A - I/I REACT D/T INDWELLING URETHRAL CATHETER, INIT; N39.0 - 

URINARY TRACT INFECTION, SITE NOT SPECIFIED   Status: Suspected   





(3) Transaminitis


Code(s): R74.0 - NONSPEC ELEV OF LEVELS OF TRANSAMNS & LACTIC ACID DEHYDRGNSE   

Status: Acute   





(4) Macrocytic anemia


Code(s): D53.9 - NUTRITIONAL ANEMIA, UNSPECIFIED   Status: Chronic   





(5) Alcohol abuse


Code(s): F10.10 - ALCOHOL ABUSE, UNCOMPLICATED   Status: Chronic   





(6) BPH (benign prostatic hyperplasia)


Code(s): N40.0 - BENIGN PROSTATIC HYPERPLASIA WITHOUT LOWER URINRY TRACT SYMP   

Status: Chronic   





(7) HTN (hypertension)


Code(s): I10 - ESSENTIAL (PRIMARY) HYPERTENSION   Status: Chronic   





(8) Malnourished


Code(s): E46 - UNSPECIFIED PROTEIN-CALORIE MALNUTRITION   Status: Chronic   





(9) Peripheral edema


Code(s): R60.9 - EDEMA, UNSPECIFIED   Status: Chronic   





(10) Acute kidney injury


Code(s): N17.9 - ACUTE KIDNEY FAILURE, UNSPECIFIED   Status: Resolved   





(11) Thrombocytopenia


Code(s): D69.6 - THROMBOCYTOPENIA, UNSPECIFIED   Status: Acute   





- Plan


Plan: 





77 yr old male with PMH of BPH, blindness, HTN presents for hematuria. 





1. severe sepsis with gram negative bacteremia 2/2 UTI from chronic indwelling 

catheter


- cont on broad spectrum abx until sensitivities reported.


-Cipro has been added since his last UTI (< 1 month ago) revealed myroides 

which was only sensitive to cipro/levaquin. 


-sepsis resolved and has been afebrile. Once tolerating adequate PO fluids, 

will D/C fluids 





2. UTI


-gram neg rods on CX


-see #1 





3. BPH


-per urology, cont indwelling catheter





4. gross hematuria


- resolving





5. transaminitis likely 2/2 chronic Hep B infection and alcohol abuse 





6. chronic Hepatitis B infection


-obtain Hep B DNA


-RUQ sono revealing mass like lesion in gallbladder vs sludge. 


-will evaluate further with imaging. 





7. thrombocytopenia


-likely due to chronic hep B and alcoholism 





8. suspected heart failure


-obtain ECHO


-BNP > 700





9. macrocytic anemia


-pending folate


-B12 wnl





10. alcohol abuse


- cont on ASE protocol


-daily multivitamin, thiamine, folic acid 





11. Acute kidney injury-resolved





12. blindness


-request records from Dr. Hull 


-Will have CM see patient for resources 





13. deconditioning


-will discuss with family and determine if patient is willing to try and 

participate with PT. 


-pending OT eval. 





Dispo- pending clinical course but potentially in 1-2 days.  





<Shobha Cuellar - Last Filed: 05/10/18 10:07>





Attending Addendum





- Attending Addendum


Date/Time: 05/10/18 2121





I personally evaluated the patient and discussed the management with Dr. Cuellar


I agree with the History, Examination, Assessment and Plan documented above 

with any addition or exceptions noted below- Patient without complaints. 

Declined PT yesterday. Uncertain if he would be willing to go to a skilled 

unit. Afebrile VSS  A/P: 1) Sepsis secondary to UTI with gram negative bacteria

- continue current abx. Awaiting urine and blood culture results for bacterial 

identification. 2) Knee pain- will check xray. 3) alcohol use- no evidence of 

withdrawal/ DTs. Continue to monitor. 4) BPH- continue plaza and finasteride. 








<Jessie Davidson - Last Filed: 05/10/18 21:27>

## 2018-05-10 NOTE — CON
DATE OF CONSULTATION:  05/09/2018

 

HISTORY OF PRESENT ILLNESS:  Mr. Esteves is a poor historian.  He is a 77-year-
old male who was admitted to the intermediate care unit and sought 
consultation.  He is blind.

 

He has history of hypertension.

 

Apparently, he had recent hematuria as well as some shortness of breath.  He is 
a heavy drinker.  It is unclear to me how he is a heavy drinker when he is blind
, but I suppose family is buying him his alcohol.

 

He is nonambulatory.  Apparently, he has no regular physician follow up.

 

Some of medications at admission.  He was found to be bacteremic.  He 
subsequently was admitted and placed in the Intermediate Care Unit.

 

He is a do not resuscitate patient.

 

PAST MEDICAL HISTORY:  Remarkable for COPD.  He denies being told he has had 
COPD, but he takes Symbicort he says at home.

 

He has cerebrovascular accident in the past, heavy alcohol use, distant heavy 
tobacco use, marijuana use.  As mentioned, he has been regularly seen a 
physician, who was on no medications on admission.

 

FAMILY HISTORY:  Negative for lung disease in early age.

 

REVIEW OF SYSTEMS:  That could be obtained was otherwise negative.  He tells me 
that he feels fine and wants to go home.

 

PHYSICAL EXAMINATION:

VITAL SIGNS:  He is afebrile, heart rate is 83, respiratory rate is 20, 
oximetry is 100%, blood pressure 131/84 this evening.  This morning he was 
afebrile, heart rate 110, blood pressure 103/73.  

HEENT:  He is blind.  Extraocular movements cannot be assessed.

NECK:  Supple.  No lymphadenopathy.

LUNGS:  Clear.

HEART:  Regular rhythm.

ABDOMEN:  Nontender.

EXTREMITIES:  Without asymmetry.

 

LABORATORY DATA:  White count 13.9, hemoglobin 12.6, platelets 57,000.

 

Electrolytes were normal.

 

Albumin is 2.3, AST is 450, , bilirubin is 1.1.  _____.

 

IMPRESSION:

1.  Gram negative bacteremia, likely associated with gram-negative urinary 
tract infection.

2.  Alcoholic liver dysfunction, most likely.

3.  Thrombocytopenia secondary to alcoholism, most likely.

4.  Blindness.

5.  Deconditioned and bedridden state.

 

PLAN:  He is stable to move out the Intermediate Care Unit.

 

A 50-minute consult and greater than 50% of the time was spent coordinating 
care on the unit.

 

JOHN

## 2018-05-10 NOTE — ULT
RIGHT UPPER QUADRANT ULTRASOUND:

 

HISTORY: 

Chronic hepatitis D infection.  Acute liver injury.

 

COMPARISON: 

5/8/18 CT abdomen and pelvis.

 

TECHNIQUE: 

Utilizing a multihertz transducer, sonographic imaging of the right upper quadrant is performed in th
e longitudinal and transverse plane.

 

FINDINGS: 

Pancreas is obscured by overlying bowel gas.

 

Hepatic parenchyma has a normal echotexture.  No hepatic mass or intrahepatic dilatation. Contour of 
the hepatic margin is maintained.  Right hepatic lobe measures 15.1 cm.

 

Within the lumen of the gallbladder, there is echogenic material without evidence of shadowing.  Find
ings may represent sludge.  However, a mucosal-based lesion, such as mass, cannot be excluded.  There
 is a small amount of pericholecystic fluid.  Negative Morales's sign.

 

Common bile duct diameter is 0.3 cm.

 

Main portal vein is patent.  Appropriate directional flow.

 

Right Kidney:

No hydronephrosis.  10.0 x 4.3 x 5.1 cm.

 

A trace amount of perihepatic fluid.

 

IMPRESSION: 

Echogenic material in the lumen of the gallbladder as described above.  Differential considerations i
nclude sludge or possible mucosal-based mass lesion.  Correlate clinically for possible neoplasm. 

 

POS: [facility]

## 2018-05-11 LAB
ALBUMIN SERPL BCG-MCNC: 2.1 G/DL (ref 3.4–4.8)
ALP SERPL-CCNC: 53 U/L (ref 40–150)
ALT SERPL W P-5'-P-CCNC: 49 U/L (ref 8–55)
ANION GAP SERPL CALC-SCNC: 6 MMOL/L (ref 10–20)
AST SERPL-CCNC: 66 U/L (ref 5–34)
BILIRUB SERPL-MCNC: 0.8 MG/DL (ref 0.2–1.2)
BUN SERPL-MCNC: 19 MG/DL (ref 8.4–25.7)
CALCIUM SERPL-MCNC: 7.2 MG/DL (ref 7.8–10.44)
CHLORIDE SERPL-SCNC: 103 MMOL/L (ref 98–107)
CO2 SERPL-SCNC: 26 MMOL/L (ref 23–31)
CREAT CL PREDICTED SERPL C-G-VRATE: 57 ML/MIN (ref 70–130)
GLOBULIN SER CALC-MCNC: 2 G/DL (ref 2.4–3.5)
GLUCOSE SERPL-MCNC: 97 MG/DL (ref 83–110)
HBV SURFACE AG SERPL QL IA: (no result)
HGB BLD-MCNC: 10.7 G/DL (ref 14–18)
MACROCYTES BLD QL SMEAR: (no result) (100X)
MCH RBC QN AUTO: 39.2 PG (ref 27–31)
MCV RBC AUTO: 113 FL (ref 80–94)
MDIFF COMPLETE?: YES
PLATELET # BLD AUTO: 44 THOU/UL (ref 130–400)
PLATELET BLD QL SMEAR: (no result)
POTASSIUM SERPL-SCNC: 3.4 MMOL/L (ref 3.5–5.1)
RBC # BLD AUTO: 2.74 MILL/UL (ref 4.7–6.1)
SODIUM SERPL-SCNC: 132 MMOL/L (ref 136–145)
WBC # BLD AUTO: 7.1 THOU/UL (ref 4.8–10.8)

## 2018-05-11 RX ADMIN — MOMETASONE FUROATE AND FORMOTEROL FUMARATE DIHYDRATE SCH PUFF: 200; 5 AEROSOL RESPIRATORY (INHALATION) at 18:42

## 2018-05-11 RX ADMIN — MULTIPLE VITAMINS W/ MINERALS TAB SCH TAB: TAB at 08:47

## 2018-05-11 RX ADMIN — ASCORBIC ACID, VITAMIN A PALMITATE, CHOLECALCIFEROL, THIAMINE HYDROCHLORIDE, RIBOFLAVIN-5 PHOSPHATE SODIUM, PYRIDOXINE HYDROCHLORIDE, NIACINAMIDE, DEXPANTHENOL, ALPHA-TOCOPHEROL ACETATE, VITAMIN K1, FOLIC ACID, BIOTIN, CYANOCOBALAMIN SCH: 200; 3300; 200; 6; 3.6; 6; 40; 15; 10; 150; 600; 60; 5 INJECTION, SOLUTION INTRAVENOUS at 08:43

## 2018-05-11 RX ADMIN — MOMETASONE FUROATE AND FORMOTEROL FUMARATE DIHYDRATE SCH PUFF: 200; 5 AEROSOL RESPIRATORY (INHALATION) at 06:24

## 2018-05-11 RX ADMIN — Medication SCH ML: at 08:46

## 2018-05-11 RX ADMIN — Medication SCH ML: at 21:14

## 2018-05-11 NOTE — CON
DATE OF CONSULTATION:  05/11/2018

 

REASON FOR CONSULTATION:  Urosepsis.

 

HISTORY OF PRESENT ILLNESS:  A 77-year-old who has a history of chronic 
blindness of uncertain etiology and progressive functional impairment having 
lost his ambulatory status about 6 months before admission and chronic 
hepatitis B as well as prior CVA which probably led to his functional impairment
, admitted with gross hematuria following exchange of his Burgess catheter.  The 
patient has BPH and a Burgess catheter inserted by Dr. Oliveira, urologist, at 
[facility] about 3 months before admission.  The catheter 
has been exchanged once a month and the last exchange was a few days before 
admission.  Following development of gross hematuria, he developed a fever and 
was brought to the emergency room and admitted.  The patient had reported no 
headaches, no vomiting or respiratory symptoms and he was constipated and no 
skin disorder had been reported.  He has significant cognitive impairment and 
the personal account has limited accuracy.

 

MEDICAL HISTORY:  Includes BPH which has been managed with a Burgess catheter.  I 
do not believe that there is any plan for any invasive procedure at this point 
in time on the part of the urologist.  History of alcoholic beverage abuse, 
prior smoking, prior CVA with weakness in lower extremities and wheelchair 
bound state for the past six months.  Chronic blindness, apparently one of the 
eyes function was lost due to the CVA.  The other is unclear with the reason 
for it.  There is a listed diagnosis of COPD as well.

 

SOCIAL HISTORY:  He lives in [place] with wife and another relative, who help 
care for him.  Still drinking.  According to the wife, drinks about 4 drinks 
per day, but in the emergency room visit 10 drinks per day is listed.

 

ALLERGIES:  None.

 

MEDICATIONS:  Had been on Bystolic, finasteride, Norvasc, Benicar, triamterene, 
hydrochlorothiazide, Symbicort, aspirin.  Here he is receiving DuoNeb, Ecotrin, 
Cipro, Proscar, Folvite, Theragran, omeprazole and Zosyn.

 

FAMILY HISTORY:  Noncontributory.

 

PERSONAL PHYSICIAN:  Sam Rodriguez M.D.

 

PHYSICAL EXAMINATION:

VITAL SIGNS:  Temperature max of 101 on admission in the emergency room, has 
been afebrile since.  Blood pressure 120/80, pulse 94, respirations 16, O2 sat 
100.

SKIN:  With no areas of skin breakdown.  The patient has a Burgess catheter in 
place and a peripheral IV access.  The urine is clear.  There is no 
lymphadenopathy.

HEENT:  Ocular movements are conjugate.  Pupils are 2 mm.  There is 
opacification of the left intraocular media.  Conjunctivae are pale.  Nasal 
passages are patent.  Oral cavity with numerous missing teeth.  Remainder ones 
with a lot of decay.

NECK:  Supple, no jugular venous distention.

LUNGS:  With symmetric air entry, no crackles or wheezing.

HEART:  S1, S2, regular rate without murmurs.

ABDOMEN:  Stout and somewhat distended, question of ascites.  No 
hepatosplenomegaly detected.  No bladder distention.  The genital exam was not 
remarkable.  Patient has atrophy of the musculature of the lower extremities 
and some degree of ankylosis of the knees, limitation of range of motion of the 
ankles.  He is able to wiggle his toes.  His spinal responses are flexure.  No 
clonus.

EXTREMITIES:  Pulses are faintly palpable on dorsalis pedis.  Cap refill is 
less than 3 seconds.

NEUROLOGIC:  He is awake.  He knows his name and knew he was in the hospital, 
could not remember the events that led to his admission, though he did not know 
the date, was able to follow commands.

 

LABORATORY DATA:  White cell count was 15 down to 7.1, hemoglobin down to 10.7, 
, platelets 144,000 with 78% neutrophils.  Bands are down from 34 to 4.  
INR 1.6 and sodium 137-132, creatinine 0.93, bilirubin 1.1.  AST down from 450-
66, ALT down from 112-49, albumin 2.3 and now 2.1.  Urinalysis with greater 
than 50 WBCs, vancomycin trough 9.4.  Serology with hepatitis surface antigen 
reactive.  Hepatitis core total antibody reactive.  Hepatitis surface antibody 
nonreactive, Hepatitis C nonreactive.  HIV nonreactive, RPR nonreactive, 
hepatitis A positive for a total antibody.  Microbiology with 2/2 sets of blood 
cultures with Providencia rettgeri 2 different strains.  The urine culture also 
with single bacterium species, susceptibility of the Providencia include a 
susceptibility to quinolones, resistant to third generation cephalosporins and 
resistant to Bactrim in one of them.  An abdomen ultrasound was completed, as 
well as an abdomen and pelvis CT.  The ultrasound showed no hydronephrosis, 
normal hepatic echotexture.  There is some echogenic material within the lumen 
of the gallbladder.  The abdomen and pelvis CT showed fat stranding, question 
of pneumatosis, distended bladder with enlarged prostate and emphysema.  Chest x
-ray with no evidence of acute cardiopulmonary disease.

 

ASSESSMENT:

1.  Alcoholism.

2.  Chronic hepatitis B infection.

3.  Chronic liver disease, possible cirrhosis.

4.  Benign prostatic hypertrophy, being managed conservatively with Burgess 
catheter insertion with urosepsis.  Two different strains Providencia.  
Functional impairment with paraparesis and inability to ambulate for the past 
six months.

 

DISCUSSION:  The patient developed urosepsis from the recent Burgess exchange.  
The rates of invasive urinary tract infection associated with Burgess catheters 
will be an ongoing issue in the future for this patient and the attending 
physician may consider placement of a suprapubic catheter instead.  Alternately 
review of the notes from Dr. Oliveira at [facility] would be recommended 
to see what kind of workup that he carried out and if a voiding trial was 
attempted with treatment for the BPH.  The patient has chronic hepatitis B 
surface, positive hepatitis E antigen, negative serology and I recommend 
checking his hepatitis B, DNA, PCR and then deciding if he would benefit from 
hepatitis B treatment antiviral with chronic antiviral compounds.  I agree with 
ciprofloxacin, may transition to oral regimen, discontinue Zosyn and continue 
treating for approximately 2 weeks.  The Sphingomonas is probably not playing a 
role in the systemic symptoms that led to patient's admission and I do not 
think it needs to be targeted by the regimen prescribed for discharge planning.

 

MTDD

## 2018-05-11 NOTE — MRI
MRI ABDOMEN WITHOUT AND WITH CONTRAST:

 

Comparison: Gallbladder ultrasound, 5-10-18. CT abdomen/pelvis, 5-8-18. 

 

History: Possible gallbladder mass. Evaluate further. 

 

Technique: Multiplanar, multisequence MRI images were obtained of the abdomen without and with IV con
trast. 

 

FINDINGS: 

This exam is very limited secondary to motion artifact. There is a filling defect in the dependent as
pect of the gallbladder, best seen on the T2 images. This could represent small stones or sludge. Aft
er administration of contrast, no enhancement of this region is seen to suggest that this an enhancin
g gallbladder mass. As stated above, evaluation is limited secondary to motion artifact. 

 

There is a tiny subcentimeter focus of high T2 signal in each kidney which likely represents a small 
cyst. No focal liver lesions are seen. The adrenal glands, spleen, and pancreas are unremarkable. 

 

No abdominal adenopathy is seen. No marrow signal abnormality is present. There are small bilateral p
leural effusions. 

 

IMPRESSION: 

1. The material within the gallbladder likely represents either sludge or nonshadowing stones. No enh
ancement is seen to suggest that this is an enhancing mass. However, evaluation is limited. On the pr
ior ultrasound, no obvious flow was seen within this mass like region in the dependent gallbladder on
 the color flow images, helping confirm that this lesion is not likely a tumor. 

2. Tiny bilateral renal cysts. 

 

POS: [facility]

## 2018-05-11 NOTE — PDOC.FM
- Subjective


Subjective: 





Patient feeling okay this AM. He is pleasant. He does note only being able to 

eat or drink when someone is in the room to help him. Denies pain. 





- Objective


MAR Reviewed: Yes


Vital Signs & Weight: 


 Vital Signs (12 hours)











  Temp Pulse Resp BP Pulse Ox


 


 05/11/18 07:41  98.4 F  94  16  128/82  100








 Weight











Admit Weight                   56.812 kg


 


Weight                         58.1 kg














I&O: 


 











 05/10/18 05/11/18 05/12/18





 06:59 06:59 06:59


 


Intake Total 650 2040 


 


Output Total 530 275 


 


Balance 120 1765 











Result Diagrams: 


 05/11/18 03:49





 05/11/18 03:49





<Shobha Cuellar - Last Filed: 05/11/18 10:34>





- Objective


Vital Signs & Weight: 


 Vital Signs (12 hours)











  Temp Pulse Resp BP Pulse Ox


 


 05/11/18 18:43      96


 


 05/11/18 18:42      96


 


 05/11/18 08:00  98.4 F  94  16   100


 


 05/11/18 07:41  98.4 F  94  16  128/82  100








 Weight











Admit Weight                   56.812 kg


 


Weight                         58.1 kg














I&O: 


 











 05/10/18 05/11/18 05/12/18





 06:59 06:59 06:59


 


Intake Total 650 2040 


 


Output Total 530 275 


 


Balance 120 1765 











Result Diagrams: 


 05/11/18 03:49





 05/11/18 03:49





<Jessie Davidson - Last Filed: 05/11/18 19:02>





Phys Exam





- Physical Examination


Constitutional: NAD


HEENT: moist MMs


Respiratory: no wheezing, no rales, no rhonchi


distant breath sound, poor respiratory effort 


Cardiovascular: RRR, no significant murmur


distant, faint heart sounds 


Gastrointestinal: soft, non-tender


2+ pittin gedema now isolated to bilateral feet 


Neurological: non-focal, moves all 4 limbs


Psychiatric: A&O x 3


Skin: cap refill <2 seconds





<Shobha Cuellar - Last Filed: 05/11/18 10:34>





Dx/Plan


(1) Severe sepsis


Code(s): A41.9 - SEPSIS, UNSPECIFIED ORGANISM; R65.20 - SEVERE SEPSIS WITHOUT 

SEPTIC SHOCK   Status: Resolved   





(2) UTI (urinary tract infection) due to urinary indwelling catheter


Code(s): T83.511A - I/I REACT D/T INDWELLING URETHRAL CATHETER, INIT; N39.0 - 

URINARY TRACT INFECTION, SITE NOT SPECIFIED   Status: Suspected   





(3) Transaminitis


Code(s): R74.0 - NONSPEC ELEV OF LEVELS OF TRANSAMNS & LACTIC ACID DEHYDRGNSE   

Status: Acute   





(4) Macrocytic anemia


Code(s): D53.9 - NUTRITIONAL ANEMIA, UNSPECIFIED   Status: Chronic   





(5) Alcohol abuse


Code(s): F10.10 - ALCOHOL ABUSE, UNCOMPLICATED   Status: Chronic   





(6) BPH (benign prostatic hyperplasia)


Code(s): N40.0 - BENIGN PROSTATIC HYPERPLASIA WITHOUT LOWER URINRY TRACT SYMP   

Status: Chronic   





(7) HTN (hypertension)


Code(s): I10 - ESSENTIAL (PRIMARY) HYPERTENSION   Status: Chronic   





(8) Malnourished


Code(s): E46 - UNSPECIFIED PROTEIN-CALORIE MALNUTRITION   Status: Chronic   





(9) Peripheral edema


Code(s): R60.9 - EDEMA, UNSPECIFIED   Status: Chronic   





(10) Acute kidney injury


Code(s): N17.9 - ACUTE KIDNEY FAILURE, UNSPECIFIED   Status: Resolved   





(11) Thrombocytopenia


Code(s): D69.6 - THROMBOCYTOPENIA, UNSPECIFIED   Status: Acute   





- Plan


Plan: 





77 yr old male who presentsed to hospital and found to be in sepsis





1. sepsis bacteremia 2/2 providencia rettgeri- now improved. susceptible to 

zosyn and cipro. Can DC vanc. Will run case by ID. 





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 

this. Still an additional Gram neg cliff to be determined in urine cx. 





3. hypokalemia-replete





4. gallbladder lesion- pending Abdominal MRI 





5. transaminitis - improving, thought to be 2/2 chronic hepatitis B and 

alcoholism 





6. chronic Hep B- pending Hep B PCR quant 





7. thrombocytopenia- likely 2/2 chronic liver disease/ alcoholism 





8. malnourished- supplements provided. 





9. poor urinary output- kidney function wnl, This is worsened after fluids 

decreased. Have encouraged patient to drink everytime someone enters room. Have 

also given 500 ml bolus. 





10. BPH- chronic indwelling catheter. Awaiting records from Dr. Oliveira. 





11. macrocytic anemia- b12 wnl. pending RBC folate. 





12. blindness





13. alcohol abuse- cessation counseling





<Shobha Cuellar - Last Filed: 05/11/18 10:34>





Attending Addendum





- Attending Addendum


Date/Time: 05/11/18 3661





I personally evaluated the patient and discussed the management with Dr. Cuellar


I agree with the History, Examination, Assessment and Plan documented above 

with any addition or exceptions noted below- Patient without complaints. 

Patient states that appetite improving. Afebrile VSS. A/P: 1) Sepsis secondary 

to Providencia UTI- trasnitioned to po cipro; appreciate recommendations from 

Dr. Henderson. Will need follow-up with Dr. Oliveira post-hospitalization. 2) 

Chronic Hep B infection- Hep B PCR pending. 3) Gallbladder lesion- MRI c/w 

sludge or gallstone; no need for further workup unless patient becomes 

symptomatic. 4) Deconditioning- declines SNU placement; anticipate d/c soon.








<Jessie Davidson - Last Filed: 05/11/18 19:02>

## 2018-05-12 LAB
ALBUMIN SERPL BCG-MCNC: 2.1 G/DL (ref 3.4–4.8)
ALP SERPL-CCNC: 59 U/L (ref 40–150)
ALT SERPL W P-5'-P-CCNC: 39 U/L (ref 8–55)
ANION GAP SERPL CALC-SCNC: 6 MMOL/L (ref 10–20)
AST SERPL-CCNC: 46 U/L (ref 5–34)
BILIRUB SERPL-MCNC: 0.8 MG/DL (ref 0.2–1.2)
BUN SERPL-MCNC: 13 MG/DL (ref 8.4–25.7)
CALCIUM SERPL-MCNC: 7.2 MG/DL (ref 7.8–10.44)
CHLORIDE SERPL-SCNC: 107 MMOL/L (ref 98–107)
CO2 SERPL-SCNC: 26 MMOL/L (ref 23–31)
CREAT CL PREDICTED SERPL C-G-VRATE: 67 ML/MIN (ref 70–130)
GLOBULIN SER CALC-MCNC: 2.2 G/DL (ref 2.4–3.5)
GLUCOSE SERPL-MCNC: 102 MG/DL (ref 83–110)
HBV DNA SERPL NAA+PROBE-ACNC: 260 IU/ML
HBV DNA SERPL NAA+PROBE-LOG IU: 2.42 {LOG_IU}/ML
HGB BLD-MCNC: 11.1 G/DL (ref 14–18)
MACROCYTES BLD QL SMEAR: (no result) (100X)
MAGNESIUM SERPL-MCNC: 1.3 MG/DL (ref 1.6–2.6)
MCH RBC QN AUTO: 39.1 PG (ref 27–31)
MCV RBC AUTO: 112 FL (ref 80–94)
MDIFF COMPLETE?: YES
PLATELET # BLD AUTO: 49 THOU/UL (ref 130–400)
PLATELET BLD QL SMEAR: (no result)
POTASSIUM SERPL-SCNC: 3.3 MMOL/L (ref 3.5–5.1)
RBC # BLD AUTO: 2.85 MILL/UL (ref 4.7–6.1)
SODIUM SERPL-SCNC: 136 MMOL/L (ref 136–145)
WBC # BLD AUTO: 5.6 THOU/UL (ref 4.8–10.8)

## 2018-05-12 RX ADMIN — MULTIPLE VITAMINS W/ MINERALS TAB SCH TAB: TAB at 07:50

## 2018-05-12 RX ADMIN — Medication SCH: at 20:43

## 2018-05-12 RX ADMIN — Medication SCH: at 07:51

## 2018-05-12 RX ADMIN — MOMETASONE FUROATE AND FORMOTEROL FUMARATE DIHYDRATE SCH PUFF: 200; 5 AEROSOL RESPIRATORY (INHALATION) at 19:28

## 2018-05-12 RX ADMIN — ASPIRIN SCH MG: 81 TABLET ORAL at 07:50

## 2018-05-12 RX ADMIN — MOMETASONE FUROATE AND FORMOTEROL FUMARATE DIHYDRATE SCH PUFF: 200; 5 AEROSOL RESPIRATORY (INHALATION) at 06:24

## 2018-05-12 NOTE — PDOC.FM
- Subjective


Subjective: 





Patient is doing well this morning.  Denies any pain, fever, N/V, abdominal 

pain.  He slept well.  He is awaiting breakfast this morning. No acute events 

overnight. He reports he has tried to increase PO intake but difficult wiht 

loss of vision. 





- Objective


MAR Reviewed: Yes


Vital Signs & Weight: 


 Vital Signs (12 hours)











  Temp Pulse Resp BP Pulse Ox


 


 05/12/18 06:25      96


 


 05/12/18 06:24   80  16   96


 


 05/12/18 03:50      100


 


 05/11/18 20:00  98.8 F  85  18   97


 


 05/11/18 19:47  98.8 F  85  18  125/77  100








 Weight











Admit Weight                   56.812 kg


 


Weight                         58.3 kg














I&O: 


 











 05/10/18 05/11/18 05/12/18





 06:59 06:59 06:59


 


Intake Total 650 2040 790


 


Output Total 530 275 250


 


Balance 120 1765 540











Result Diagrams: 


 05/12/18 04:23





 05/12/18 04:23





<Aggie Ritchie - Last Filed: 05/12/18 11:58>





- Objective


Vital Signs & Weight: 


 Vital Signs (12 hours)











  Temp Pulse Resp BP Pulse Ox


 


 05/12/18 11:28  98.3 F  87  16  161/97 H  96


 


 05/12/18 08:00  98.6 F  79  18   95


 


 05/12/18 07:22  98.6 F  79  18  147/90 H  95


 


 05/12/18 06:25      96


 


 05/12/18 06:24   80  16   96


 


 05/12/18 03:50      100








 Weight











Admit Weight                   56.812 kg


 


Weight                         58.3 kg














I&O: 


 











 05/11/18 05/12/18 05/13/18





 06:59 06:59 06:59


 


Intake Total 2040 790 


 


Output Total 275 250 


 


Balance 1765 540 











Result Diagrams: 


 05/12/18 04:23





 05/12/18 04:23





<Hussain Stephens - Last Filed: 05/12/18 12:10>





Phys Exam





- Physical Examination


Constitutional: NAD


visual loss bilaterally


Respiratory: no wheezing, no rales, clear to auscultation bilateral


Cardiovascular: RRR, no significant murmur


Gastrointestinal: soft, non-tender, positive bowel sounds


Musculoskeletal: no edema, pulses present


Psychiatric: normal affect, A&O x 3





<Aggie Ritchie - Last Filed: 05/12/18 11:58>





Dx/Plan


(1) UTI (urinary tract infection) due to urinary indwelling catheter


Code(s): T83.511A - I/I REACT D/T INDWELLING URETHRAL CATHETER, INIT; N39.0 - 

URINARY TRACT INFECTION, SITE NOT SPECIFIED   Status: Suspected   





(2) Thrombocytopenia


Code(s): D69.6 - THROMBOCYTOPENIA, UNSPECIFIED   Status: Acute   





(3) Transaminitis


Code(s): R74.0 - NONSPEC ELEV OF LEVELS OF TRANSAMNS & LACTIC ACID DEHYDRGNSE   

Status: Acute   





(4) Alcohol abuse


Code(s): F10.10 - ALCOHOL ABUSE, UNCOMPLICATED   Status: Chronic   





(5) BPH (benign prostatic hyperplasia)


Code(s): N40.0 - BENIGN PROSTATIC HYPERPLASIA WITHOUT LOWER URINRY TRACT SYMP   

Status: Chronic   





- Plan


Plan: 





Sepsis bacteremia 2/2 providencia rettgeri- 


- now improved


- continue Cipro PO per Dr. Henderson





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


- there will be no susceptibilities to this. Still an additional Gram neg cliff 

to be determined in urine cx. 


- Will continue with Cipro PO per Dr. Henderson





Hypokalemia


- replete and recheck tomorrow





Hypomagnesemia


- replete and recheck tomorrow





Gallbladder lesion


- MRI confirms no mass apparent





Transaminitis 


- improving, thought to be 2/2 chronic hepatitis B and alcoholism 





Chronic Hep B


- pending Hep B PCR quant 





Thrombocytopenia


- likely 2/2 chronic liver disease/ alcoholism 





Malnourished


- supplements provided. 





Poor urinary output


- kidney function wnl, This is worsened after fluids decreased. Have encouraged 

patient to drink everytime someone enters room.


- s/p 500ml bolus yesterday.  Repeat today. 





BPH


- chronic indwelling catheter


- per records from Dr. Blankenship, needs outpatient follow up for voiding trial


- restarted home Finasteride and Flomax yesterday.   





Macrocytic anemia


- b12 wnl. pending RBC folate. 


- likely 2/2 bone marrow suppression from alcohol intake.





Blindness


- awaiting records from Dr. Hull





Alcohol abuse


- cessation counseling








<Aggie Ritchie - Last Filed: 05/12/18 11:58>





Attending Addendum





- Attending Addendum


Date/Time: 05/12/18 8931





I personally evaluated the patient and discussed the management with Dr. Ritchie.


I agree with the History, Examination, Assessment and Plan documented above 

with any addition or exceptions noted below.





Patient overall doing well. He has been transitioned to oral Cipro. No evidence 

of worsening infection. He will need to follow up with Urology outpatient about 

his chronic plaza use. He has had some decreased PO fluid intake and decrease 

UOP but with normal renal labs. Will give gentle fluids and having nursing 

assist patient with eating/drinking as he is functionally blind. Consider 

discharge tomorrow if doing well. 








<Hussain Stephens - Last Filed: 05/12/18 12:10>

## 2018-05-13 LAB
ALBUMIN SERPL BCG-MCNC: 2.2 G/DL (ref 3.4–4.8)
ALP SERPL-CCNC: 57 U/L (ref 40–150)
ALT SERPL W P-5'-P-CCNC: 34 U/L (ref 8–55)
ANALYZER IN CARDIO: (no result)
ANION GAP SERPL CALC-SCNC: 6 MMOL/L (ref 10–20)
AST SERPL-CCNC: 31 U/L (ref 5–34)
BASE EXCESS STD BLDA CALC-SCNC: -1.6 MEQ/L
BILIRUB SERPL-MCNC: 0.6 MG/DL (ref 0.2–1.2)
BUN SERPL-MCNC: 8 MG/DL (ref 8.4–25.7)
CA-I BLDA-SCNC: 1.2 MMOL/L (ref 1.12–1.3)
CALCIUM SERPL-MCNC: 7.3 MG/DL (ref 7.8–10.44)
CHLORIDE SERPL-SCNC: 110 MMOL/L (ref 98–107)
CK MB SERPL-MCNC: 0.9 NG/ML (ref 0–6.6)
CK MB SERPL-MCNC: 0.9 NG/ML (ref 0–6.6)
CO2 SERPL-SCNC: 24 MMOL/L (ref 23–31)
CREAT CL PREDICTED SERPL C-G-VRATE: 72 ML/MIN (ref 70–130)
GLOBULIN SER CALC-MCNC: 2.3 G/DL (ref 2.4–3.5)
GLUCOSE SERPL-MCNC: 101 MG/DL (ref 83–110)
HCO3 BLDA-SCNC: 23.1 MEQ/L (ref 22–26)
HCT VFR BLDA CALC: 39 % (ref 42–52)
HGB BLD-MCNC: 11.4 G/DL (ref 14–18)
HGB BLD-MCNC: 13 G/DL (ref 14–18)
HGB BLDA-MCNC: 13.4 G/DL (ref 14–18)
MAGNESIUM SERPL-MCNC: 1.7 MG/DL (ref 1.6–2.6)
MCH RBC QN AUTO: 38.4 PG (ref 27–31)
MCV RBC AUTO: 111 FL (ref 80–94)
MDIFF COMPLETE?: YES
PCO2 BLDA: 39.4 MMHG (ref 35–45)
PH BLDA: 7.39 [PH] (ref 7.35–7.45)
PLATELET # BLD AUTO: 64 THOU/UL (ref 130–400)
PLATELET # BLD AUTO: 77 THOU/UL (ref 130–400)
PLATELET BLD QL SMEAR: (no result)
PO2 BLDA: 153.4 MMHG (ref 80–100)
POTASSIUM SERPL-SCNC: 3.9 MMOL/L (ref 3.5–5.1)
RBC # BLD AUTO: 2.97 MILL/UL (ref 4.7–6.1)
SODIUM SERPL-SCNC: 136 MMOL/L (ref 136–145)
SPECIMEN DRAWN FROM PATIENT: (no result)
TROPONIN I SERPL DL<=0.01 NG/ML-MCNC: 0.02 NG/ML (ref ?–0.03)
TROPONIN I SERPL DL<=0.01 NG/ML-MCNC: 0.03 NG/ML (ref ?–0.03)
WBC # BLD AUTO: 6.3 THOU/UL (ref 4.8–10.8)

## 2018-05-13 RX ADMIN — Medication SCH: at 08:47

## 2018-05-13 RX ADMIN — ASPIRIN SCH MG: 81 TABLET ORAL at 08:46

## 2018-05-13 RX ADMIN — Medication SCH: at 20:15

## 2018-05-13 RX ADMIN — MOMETASONE FUROATE AND FORMOTEROL FUMARATE DIHYDRATE SCH PUFF: 200; 5 AEROSOL RESPIRATORY (INHALATION) at 07:37

## 2018-05-13 RX ADMIN — MULTIPLE VITAMINS W/ MINERALS TAB SCH TAB: TAB at 08:46

## 2018-05-13 RX ADMIN — MOMETASONE FUROATE AND FORMOTEROL FUMARATE DIHYDRATE SCH PUFF: 200; 5 AEROSOL RESPIRATORY (INHALATION) at 18:37

## 2018-05-13 NOTE — ULT
BILATERAL LOWER EXTREMITY DOPPLER VENOUS ULTRASOUND:

 

Date:  05/13/18 

 

INDICATION:

History of bilateral Pes. 

 

TECHNIQUE:  

Gray scale, color Doppler, and vascular duplex with spectral analysis was performed of the deep venou
s structures of the bilateral lower extremities. The common femoral vein, superficial femoral vein, p
roximal greater saphenous vein, proximal greater profunda vein, popliteal, and posterior tibial veins
 were assessed. 

 

FINDINGS:

Normal compression, flow, and augmentation was seen within the deep venous structures of the bilatera
l lower extremities. 

 

IMPRESSION: 

No evidence of deep venous thrombosis within the bilateral lower extremities. 

 

 

POS: [facility]

## 2018-05-13 NOTE — PDOC.FM
- Subjective


Subjective: 





Patient doing well this morning.  Had increased PO intake yesterday.  No 

complaints and no overnight events. 





- Objective


MAR Reviewed: Yes


Vital Signs & Weight: 


 Vital Signs (12 hours)











  Temp Pulse Resp BP Pulse Ox


 


 05/13/18 06:17  97.8 F  74  18  145/81 H  95


 


 05/12/18 20:00  97.9 F  73  16   97


 


 05/12/18 19:28   73  16   96








 Weight











Admit Weight                   56.812 kg


 


Weight                         57.4 kg














I&O: 


 











 05/11/18 05/12/18 05/13/18





 06:59 06:59 06:59


 


Intake Total 2040 790 2480


 


Output Total 275 250 650


 


Balance 5354 379 2750











Result Diagrams: 


 05/13/18 04:45





 05/13/18 04:45





<Aggie Ritchie - Last Filed: 05/13/18 10:15>





- Objective


Vital Signs & Weight: 


 Vital Signs (12 hours)











  Temp Pulse Resp BP BP Pulse Ox


 


 05/13/18 07:54  98.0 F  87  18  154/99 H   98


 


 05/13/18 07:37   68  12   


 


 05/13/18 06:17  97.8 F  74  18   145/81 H  95








 Weight











Admit Weight                   56.812 kg


 


Weight                         57.4 kg














I&O: 


 











 05/12/18 05/13/18 05/14/18





 06:59 06:59 06:59


 


Intake Total 790 2480 


 


Output Total 250 650 


 


Balance 540 1830 











Result Diagrams: 


 05/13/18 04:45





 05/13/18 04:45





<Hussain Stephens - Last Filed: 05/13/18 11:04>





Phys Exam





- Physical Examination


Constitutional: NAD


Respiratory: no wheezing, no rales, clear to auscultation bilateral


Cardiovascular: RRR, no significant murmur


Gastrointestinal: soft, non-tender


Musculoskeletal: no edema


Neurological: moves all 4 limbs


Psychiatric: normal affect, A&O x 3





<Aggie Ritchie - Last Filed: 05/13/18 10:15>





Dx/Plan


(1) UTI (urinary tract infection) due to urinary indwelling catheter


Code(s): T83.511A - I/I REACT D/T INDWELLING URETHRAL CATHETER, INIT; N39.0 - 

URINARY TRACT INFECTION, SITE NOT SPECIFIED   Status: Suspected   





(2) Thrombocytopenia


Code(s): D69.6 - THROMBOCYTOPENIA, UNSPECIFIED   Status: Acute   





(3) Transaminitis


Code(s): R74.0 - NONSPEC ELEV OF LEVELS OF TRANSAMNS & LACTIC ACID DEHYDRGNSE   

Status: Acute   





(4) Alcohol abuse


Code(s): F10.10 - ALCOHOL ABUSE, UNCOMPLICATED   Status: Chronic   





(5) BPH (benign prostatic hyperplasia)


Code(s): N40.0 - BENIGN PROSTATIC HYPERPLASIA WITHOUT LOWER URINRY TRACT SYMP   

Status: Chronic   





- Plan


Plan: 





Sepsis bacteremia 2/2 providencia rettgeri- 


- now improved


- continue Cipro PO per Dr. Henderson





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


- there will be no susceptibilities to this. Still an additional Gram neg cliff 

to be determined in urine cx. 


- Will continue with Cipro PO per Dr. Henderson


- oupatient voiding trial to remove plaza in an effort to prevent recurrence of 

UTI





Hypokalemia, resolved





Hypomagnesemia, resovled





Gallbladder lesion


- MRI confirms no mass apparent





Transaminitis, resolved 


- thought to be 2/2 chronic hepatitis B and alcoholism 





Chronic Hep B


- Hep B DNA evidence of infection


- F/u with PCP





Thrombocytopenia


- likely 2/2 chronic liver disease/ alcoholism 





Malnourished


- supplements provided. 





Poor urinary output


- kidney function wnl, This is worsened after fluids decreased. Have encouraged 

patient to drink everytime someone enters room.


- s/p 500ml bolus yesterday.  


- improved today. 





BPH


- chronic indwelling catheter


- per records from Dr. Blankenship, needs outpatient follow up for voiding trial


- continue home Finasteride and Flomax yesterday.   





Macrocytic anemia


- b12 wnl. pending RBC folate. 


- likely 2/2 bone marrow suppression from alcohol intake.





Blindness


- awaiting records from Dr. Hull





Alcohol abuse


- cessation counseling








Dispo: d/c home today. 





<Aggie Ritchie - Last Filed: 05/13/18 10:15>





Attending Addendum





- Attending Addendum


Date/Time: 05/13/18 1103





I personally evaluated the patient and discussed the management with Dr. Ritchie.


I agree with the History, Examination, Assessment and Plan documented above 

with any addition or exceptions noted below.





Patient doing well. Improved urine output with improved intake yesterday. He 

will be discharged home today per his request with  services. Continue Cipro 

therapy and needs to follow up outpatient with his PCP and Urologist. Also will 

need referral to further evaluate and consider treatment of his chronic Hep B 

infection. 








<Hussain Stephens - Last Filed: 05/13/18 11:04>

## 2018-05-13 NOTE — CT
CTA OF THE THORAX UTILIZING IV CONTRAST AND 3D REFORMATTED IMAGING:

 

Date:  05/13/18 

 

INDICATION:

Sudden onset of high blood pressure, diaphoresis, and difficulty breathing with elevated D-Dimer of 7
.18. 

 

COMPARISON:  

Chest radiograph dated 05/13/18 and CT abdomen and pelvis dated 05/08/18. 

 

FINDINGS:

There are partially occlusive thrombi seen within the segmental pulmonary artery to the posterior rig
ht lower lobe on image 82 of series 2. An additional occlusive thrombus is seen within the posteromed
ial segmental arterial branch of the right lower lobe on image 94 of series 2. There is partially occ
lusive thrombus involving the posteromedial segmental pulmonary artery of the left lower lobe on imag
e 87 of series 2. No appreciable emboli is seen within the lobar and segmental pulmonary arteries of 
left upper lobe, lingula, right middle lobe, or right upper lobe. There is slight prominence of the r
ight ventricle and right atrium with reflux of contrast within the hepatic veins which can be seen wi
th changes of early right heart strain. There are new mild to moderate bilateral pleural effusions. T
here is severe emphysema. There is mild aneurysmal dilatation of the aortic arch measuring 3.1 cm. Th
ere is ectasia of the ascending artery measuring 4.1 cm. Visualized upper abdomen is unremarkable for
 acute abnormality. There is scattered degenerative change. 

 

IMPRESSION: 

1.  Pulmonary emboli seen within the segmental pulmonary branches of the lower lobes bilaterally as d
etailed above. 

 

2.  There is prominence of the right ventricle and right atrium with reflux of contrast in the hepati
c veins which can be seen with right heart strain. There is also interval development of mild to mode
rate bilateral pleural effusions which have developed since 05/08/18. 

 

3.  Severe emphysema. 

 

4.  Mild aneurysmal dilatation of the aortic arch measuring 3.1 cm. 

 

Findings called to Dr. Ritchie at 1638 hours on 05/13/18. 

CODE CR.

 

POS: PATIENCE

## 2018-05-13 NOTE — RAD
PORTABLE AP CHEST:

 

Date:  05/13/18

 

HISTORY:  

Increasing shortness of breath. 

 

COMPARISON:  

05/08/18. 

 

FINDINGS:

The cardiac silhouette and pulmonary vasculature are within normal limits. There appears to be relati
ve lucency within the upper lung zones and findings could potentially be related to emphysematous elier
nges. The lungs are expanded. There is suboptimal evaluation of the retrocardiac region at the medial
 left lung base, but there is suggestion of increased density, which could be related to either atele
ctasis or pneumonia at the medial left lung base. Vascular calcifications seen in thoracic aorta. The
re is nonspecific gaseous distention of loops of small bowel in the upper abdomen. 

 

IMPRESSION: 

Suboptimal evaluation of the medial left lung base due to overlying cardiac silhouette, but there is 
patchy increased density. Atelectasis versus pneumonia at the left lung base is suggested. Follow-up 
PA and lateral chest x-ray is recommended for further evaluation. 

 

 

POS: [facility]

## 2018-05-14 LAB
ALBUMIN SERPL BCG-MCNC: 2 G/DL (ref 3.4–4.8)
ALP SERPL-CCNC: 50 U/L (ref 40–150)
ALT SERPL W P-5'-P-CCNC: 26 U/L (ref 8–55)
ANION GAP SERPL CALC-SCNC: 6 MMOL/L (ref 10–20)
AST SERPL-CCNC: 24 U/L (ref 5–34)
BASOPHILS # BLD AUTO: 0 THOU/UL (ref 0–0.2)
BASOPHILS NFR BLD AUTO: 0.2 % (ref 0–1)
BILIRUB SERPL-MCNC: 0.5 MG/DL (ref 0.2–1.2)
BUN SERPL-MCNC: 5 MG/DL (ref 8.4–25.7)
CALCIUM SERPL-MCNC: 7.2 MG/DL (ref 7.8–10.44)
CHLORIDE SERPL-SCNC: 109 MMOL/L (ref 98–107)
CO2 SERPL-SCNC: 23 MMOL/L (ref 23–31)
CREAT CL PREDICTED SERPL C-G-VRATE: 96 ML/MIN (ref 70–130)
EOSINOPHIL # BLD AUTO: 0.2 THOU/UL (ref 0–0.7)
EOSINOPHIL NFR BLD AUTO: 3 % (ref 0–10)
GLOBULIN SER CALC-MCNC: 2.1 G/DL (ref 2.4–3.5)
GLUCOSE SERPL-MCNC: 99 MG/DL (ref 83–110)
HGB BLD-MCNC: 10.3 G/DL (ref 14–18)
LYMPHOCYTES # BLD: 0.8 THOU/UL (ref 1.2–3.4)
LYMPHOCYTES NFR BLD AUTO: 14.4 % (ref 21–51)
MACROCYTES BLD QL SMEAR: (no result) (100X)
MCH RBC QN AUTO: 39.1 PG (ref 27–31)
MCV RBC AUTO: 113 FL (ref 80–94)
MDIFF COMPLETE?: YES
MONOCYTES # BLD AUTO: 0.7 THOU/UL (ref 0.11–0.59)
MONOCYTES NFR BLD AUTO: 11.3 % (ref 0–10)
NEUTROPHILS # BLD AUTO: 4.1 THOU/UL (ref 1.4–6.5)
NEUTROPHILS NFR BLD AUTO: 71 % (ref 42–75)
PLATELET # BLD AUTO: 102 THOU/UL (ref 130–400)
PLATELET BLD QL SMEAR: (no result)
POLYCHROMASIA BLD QL SMEAR: (no result) (100X)
POTASSIUM SERPL-SCNC: 3.6 MMOL/L (ref 3.5–5.1)
RBC # BLD AUTO: 2.64 MILL/UL (ref 4.7–6.1)
SODIUM SERPL-SCNC: 134 MMOL/L (ref 136–145)
WBC # BLD AUTO: 5.8 THOU/UL (ref 4.8–10.8)

## 2018-05-14 RX ADMIN — Medication SCH ML: at 21:51

## 2018-05-14 RX ADMIN — MOMETASONE FUROATE AND FORMOTEROL FUMARATE DIHYDRATE SCH PUFF: 200; 5 AEROSOL RESPIRATORY (INHALATION) at 07:43

## 2018-05-14 RX ADMIN — ASPIRIN SCH MG: 81 TABLET ORAL at 08:32

## 2018-05-14 RX ADMIN — MOMETASONE FUROATE AND FORMOTEROL FUMARATE DIHYDRATE SCH PUFF: 200; 5 AEROSOL RESPIRATORY (INHALATION) at 19:00

## 2018-05-14 RX ADMIN — MULTIPLE VITAMINS W/ MINERALS TAB SCH TAB: TAB at 08:32

## 2018-05-14 RX ADMIN — Medication SCH: at 08:33

## 2018-05-14 NOTE — PDOC.EVN
Event Note





- Event Note


Event Note: 





Was called to the bedside from nurse around 1600 5/13. Patient sat on the edge 

of bed and developed difficulty breathing, hypertension, tachycardia into the 

150s, and desaturation to 82%. patients o2 sats improved to 100% on 2L. 

Shortly after he was laid back in bed his vitals improved although still 

requiring o2, tachycardic, and hypertensive. An EKG was performed which showed 

new inversion of T waves in leads 2 and 3. Troponin was drawn which was normal, 

CXR showed only mild pleural effusions, and d-dimer was elevated to 7. CTA was 

performed and showed bilateral partially occlusive PEs with evidence of R heart 

strain. Dr. Cristobal was consulted and recommended heparin drip overnight and 

possible IVC placement. Patient was not on anticoagulation during his stay here 

in the hospital because his platelets were continually below 50 and he is a big 

fall risk with comorbidities of blindness and being wheelchair bound. 





<Aggie Ritchie - Last Filed: 05/14/18 06:16>





Attending Addendum





- Attending Addendum


Date/Time: 05/13/18 1600





I personally evaluated the patient and discussed the management with Dr. Ritchie.


I agree with the History, Examination, Assessment and Plan documented above 

with any addition or exceptions noted below.





Patient with respiratory and cardiovascular abnormalities noted upon change in 

position. Labs checked, including D-dimer which was elevated. CTA obtained that 

revealed likely new onset b/l PE with some CTA evidence of R ventricular 

strain. Soon after the episode of decompensation, patient was noted to have HR 

83, /80s, O2 sats 99% on 2L by NC. His respiratory distress had 

completely resolved and he was stable at that time. Transferred to telemetry 

for further monitoring. He was placed on therapeutic heparin drip for the 

evening with plans to discuss IVC filter placement tomorrow. He is poor 

anticoagulation candidate due to blindness and high fall risk, and ppx was held 

from the time of admission to the hospital due to fairly significant 

thrombocytopenia. Our team was under the impression he was OOB with therapy 

services but upon review of chart, he was discharged from PT services due to 

patient non-compliance. His Echo obtained prior to the above mentioned event 

suggests the possibility of pulmonary HTN or at least elevated pulmonary artery 

pressures. I would suspect that due to his poor mobility status that he has had 

chronic clot burden leading to these elevated pressures but that is not able to 

be proven at current time with the studies that have been performed. 








<Hussain Stephens R - Last Filed: 05/14/18 08:07>

## 2018-05-14 NOTE — CON
DATE OF CONSULTATION:  05/14/2018

 

HISTORY OF PRESENT ILLNESS:  Mr. Esteves is a very pleasant gentleman who is in the hospital with sep
sis.  He was getting ready to go home yesterday when he became acutely short of breath requiring sign
ificant oxygen supplementation and maintain oxygen saturations.  He was transferred over to the telem
etry unit for monitoring.  He had a CT angiogram performed of his chest, which showed pulmonary embol
us.  DVT scan is negative.  I have been asked to see him to discuss inferior vena cava filter placeme
nt secondary to contraindication to anticoagulation.

 

The patient is blind and has a significant fall risk, making him high risk for anticoagulation.

 

Currently, he is resting comfortably without any shortness of breath.

 

PAST MEDICAL HISTORY:

1.  Benign prostatic hypertrophy.

2.  Alcohol abuse.

3.  Blindness.

4.  Cerebrovascular accident.

5.  Chronic obstructive pulmonary disease.

 

SOCIAL HISTORY:  Lives in [place] with his wife.  He continues to abuse alcohol.

 

ALLERGIES:  None.

 

MEDICATIONS:  Noted.

 

PHYSICAL EXAMINATION:

GENERAL:  This is a well-developed, well-nourished, elderly appearing gentleman, resting comfortably.


VITAL SIGNS:  Height is 5 feet 11 inches, weight 148 pounds.  Temperature is 99.1, pulse is 85 and re
gular, blood pressure 120/66.

LUNGS:  Clear bilaterally.

HEART:  Rhythm is regular.

ABDOMEN:  Soft and nontender.

EXTREMITIES:  No edema.

VASCULAR:  Palpable femoral pulses bilaterally.

 

LABORATORY DATA:  Of note, his creatinine is 0.63.  Hemoglobin is 10.3, platelet count is 102,000.

 

ASSESSMENT AND PLAN:  Deep venous thrombosis/pulmonary embolism with contraindication to anticoagulat
ion.  I have discussed permanent inferior vena cava filter placement with him and he is agreeable to 
proceed.  Plan for placement in the morning.

## 2018-05-14 NOTE — PDOC.FM
- Subjective


Subjective: 





Patient feeling well this AM. Discharge delayed after finding of bilateral PEs 

after sitting upright for D/C. Denies SOB, CP. 





- Objective


MAR Reviewed: Yes


Vital Signs & Weight: 


 Vital Signs (12 hours)











  Temp Pulse Resp BP Pulse Ox


 


 05/14/18 07:42   86  15   97


 


 05/14/18 04:17  98.5 F  88  16  110/68  97


 


 05/14/18 00:07   83  16   100








 Weight











Admit Weight                   56.812 kg


 


Weight                         67.132 kg














I&O: 


 











 05/13/18 05/14/18 05/15/18





 06:59 06:59 06:59


 


Intake Total 2480 1670 


 


Output Total 650 450 


 


Balance 1830 1220 











Result Diagrams: 


 05/14/18 06:22





 05/14/18 06:22


Additional Labs: 





 Laboratory Tests











  05/08/18 05/09/18 05/10/18





  16:35 06:45 11:46


 


APTT   


 


D-Dimer   


 


ABG pH   


 


ABG pCO2   


 


ABG pO2   


 


CK-MB (CK-2)   


 


Troponin I   


 


Hep Bs Antigen  Reactive H  


 


Hep B Core Total Ab   Reactive H 


 


Hep B DNA, Quant log10    2.415


 


Hep B DNA (Units/mL)    260














  05/13/18 05/13/18 05/13/18





  14:12 14:26 14:29


 


APTT   


 


D-Dimer    7.18 H


 


ABG pH  7.39  


 


ABG pCO2  39.4  


 


ABG pO2  153.4 H  


 


CK-MB (CK-2)   0.9 


 


Troponin I   0.026 


 


Hep Bs Antigen   


 


Hep B Core Total Ab   


 


Hep B DNA, Quant log10   


 


Hep B DNA (Units/mL)   














  05/13/18 05/13/18 05/14/18





  17:28 17:28 00:37


 


APTT   29.5  106.4 H


 


D-Dimer   


 


ABG pH   


 


ABG pCO2   


 


ABG pO2   


 


CK-MB (CK-2)  0.9  


 


Troponin I  0.017  


 


Hep Bs Antigen   


 


Hep B Core Total Ab   


 


Hep B DNA, Quant log10   


 


Hep B DNA (Units/mL)   














  05/14/18





  06:22


 


APTT  99.3 H


 


D-Dimer 


 


ABG pH 


 


ABG pCO2 


 


ABG pO2 


 


CK-MB (CK-2) 


 


Troponin I 


 


Hep Bs Antigen 


 


Hep B Core Total Ab 


 


Hep B DNA, Quant log10 


 


Hep B DNA (Units/mL) 











Radiology Reviewed by me: Yes





<Shobha Cuellar - Last Filed: 05/14/18 10:11>





- Objective


Vital Signs & Weight: 


 Vital Signs (12 hours)











  Temp Pulse Resp BP BP Pulse Ox


 


 05/14/18 08:30  98.7 F  94  17  120/66   99


 


 05/14/18 07:42   86  15    97


 


 05/14/18 04:17  98.5 F  88  16   110/68  97


 


 05/14/18 00:07   83  16    100








 Weight











Admit Weight                   56.812 kg


 


Weight                         67.132 kg














I&O: 


 











 05/13/18 05/14/18 05/15/18





 06:59 06:59 06:59


 


Intake Total 2480 1670 


 


Output Total 650 450 


 


Balance 1830 1220 











Result Diagrams: 


 05/14/18 06:22





 05/14/18 06:22





<Osbaldo Alston - Last Filed: 05/14/18 11:06>





Phys Exam





- Physical Examination


Constitutional: NAD


HEENT: moist MMs


Respiratory: no wheezing, no rales, no rhonchi


distant, decreased breath sounds


Cardiovascular: RRR


faint heart sounds, unchanged


Gastrointestinal: soft, non-tender, no distention


BLE pedal edema, non tender 


Neurological: non-focal, moves all 4 limbs


Psychiatric: A&O x 3


Skin: cap refill <2 seconds





<Shobha Cuellar - Last Filed: 05/14/18 10:11>





Dx/Plan


(1) Severe sepsis


Code(s): A41.9 - SEPSIS, UNSPECIFIED ORGANISM; R65.20 - SEVERE SEPSIS WITHOUT 

SEPTIC SHOCK   Status: Resolved   





(2) UTI (urinary tract infection) due to urinary indwelling catheter


Code(s): T83.511A - I/I REACT D/T INDWELLING URETHRAL CATHETER, INIT; N39.0 - 

URINARY TRACT INFECTION, SITE NOT SPECIFIED   Status: Suspected   





(3) Transaminitis


Code(s): R74.0 - NONSPEC ELEV OF LEVELS OF TRANSAMNS & LACTIC ACID DEHYDRGNSE   

Status: Acute   





(4) Macrocytic anemia


Code(s): D53.9 - NUTRITIONAL ANEMIA, UNSPECIFIED   Status: Chronic   





(5) Alcohol abuse


Code(s): F10.10 - ALCOHOL ABUSE, UNCOMPLICATED   Status: Chronic   





(6) BPH (benign prostatic hyperplasia)


Code(s): N40.0 - BENIGN PROSTATIC HYPERPLASIA WITHOUT LOWER URINRY TRACT SYMP   

Status: Chronic   





(7) HTN (hypertension)


Code(s): I10 - ESSENTIAL (PRIMARY) HYPERTENSION   Status: Chronic   





(8) Malnourished


Code(s): E46 - UNSPECIFIED PROTEIN-CALORIE MALNUTRITION   Status: Chronic   





(9) Peripheral edema


Code(s): R60.9 - EDEMA, UNSPECIFIED   Status: Chronic   





(10) Acute kidney injury


Code(s): N17.9 - ACUTE KIDNEY FAILURE, UNSPECIFIED   Status: Resolved   





(11) Thrombocytopenia


Code(s): D69.6 - THROMBOCYTOPENIA, UNSPECIFIED   Status: Acute   





(12) Pulmonary embolism during current hospitalization


Code(s): I26.99 - OTHER PULMONARY EMBOLISM WITHOUT ACUTE COR PULMONALE   Status

: Acute   





- Plan


Plan: 





77 yr old male who presented to hospital and found to be in sepsis





Bilateral pulmonary embolism


-suspect acute on chronic as patient is non mobile for > 6 months


-DVT ppx had been held 2/2 low platelets during this hospitalization


-now on heparin drip 


-consulted CV surg and appreciate recs in regards to possible IVC filter 

placement 





sepsis bacteremia 2/2 providencia rettgeri


-now improved


-completing 2 wks oral cipro per ID  





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





gallbladder sludge


-s/p MRI abdomen with no acute findings


-no futher w/u 





transaminitis


-Resolved


-thought to be 2/2 chronic hepatitis B and alcoholism 





chronic Hep B


-Hep B PCR quant  elevated 


-discuss with ID 





thrombocytopenia


-likely 2/2 chronic liver disease/ alcoholism 





malnourished


-supplements provided. 





poor urinary output


-chronic


-kidney function wnl





BPH


-chronic indwelling catheter


-f/u Dr. Oliveira outpt 





macrocytic anemia


-b12 and folate wnl 


-2/2 malnutrition 





blindness





alcohol abuse


-cessation counseling





<Shobha Cuellar - Last Filed: 05/14/18 10:11>





Attending Addendum





- Attending Addendum


Date/Time: 05/14/18 1101





I personally evaluated the patient and discussed the management with Dr. Cuellar.


I agree with the History, Examination, Assessment and Plan documented above 

with any addition or exceptions noted below.


Pt. with bacteremia, sepsis, severe BPH, HTN, Chronic Hep B, Severe DJD of knee 

with immobility, progressive blindness Bilateral eyes, ETOHism and chronic 

thrombocytopenia likely due to hypersplenism developed probable acute on 

chronic PE overnight.  High fall risk, for eval of merits of IVC filter by CV 

surgery.





<Alston,Osbaldo A - Last Filed: 05/14/18 11:06>

## 2018-05-15 LAB
ALBUMIN SERPL BCG-MCNC: 2 G/DL (ref 3.4–4.8)
ALP SERPL-CCNC: 52 U/L (ref 40–150)
ALT SERPL W P-5'-P-CCNC: 24 U/L (ref 8–55)
ANION GAP SERPL CALC-SCNC: 10 MMOL/L (ref 10–20)
AST SERPL-CCNC: 27 U/L (ref 5–34)
BASOPHILS # BLD AUTO: 0 THOU/UL (ref 0–0.2)
BASOPHILS NFR BLD AUTO: 0.1 % (ref 0–1)
BILIRUB SERPL-MCNC: 0.5 MG/DL (ref 0.2–1.2)
BUN SERPL-MCNC: 4 MG/DL (ref 8.4–25.7)
CALCIUM SERPL-MCNC: 7.3 MG/DL (ref 7.8–10.44)
CHLORIDE SERPL-SCNC: 111 MMOL/L (ref 98–107)
CO2 SERPL-SCNC: 17 MMOL/L (ref 23–31)
CREAT CL PREDICTED SERPL C-G-VRATE: 102 ML/MIN (ref 70–130)
EOSINOPHIL # BLD AUTO: 0.1 THOU/UL (ref 0–0.7)
EOSINOPHIL NFR BLD AUTO: 1 % (ref 0–10)
GLOBULIN SER CALC-MCNC: 2.5 G/DL (ref 2.4–3.5)
GLUCOSE SERPL-MCNC: 99 MG/DL (ref 83–110)
HGB BLD-MCNC: 7.6 G/DL (ref 14–18)
HGB BLD-MCNC: 8.2 G/DL (ref 14–18)
HGB BLD-MCNC: 9.9 G/DL (ref 14–18)
LYMPHOCYTES # BLD: 0.9 THOU/UL (ref 1.2–3.4)
LYMPHOCYTES NFR BLD AUTO: 10.1 % (ref 21–51)
MACROCYTES BLD QL SMEAR: (no result) (100X)
MCH RBC QN AUTO: 38.2 PG (ref 27–31)
MCV RBC AUTO: 116 FL (ref 80–94)
MDIFF COMPLETE?: YES
MONOCYTES # BLD AUTO: 0.8 THOU/UL (ref 0.11–0.59)
MONOCYTES NFR BLD AUTO: 8.5 % (ref 0–10)
NEUTROPHILS # BLD AUTO: 7.2 THOU/UL (ref 1.4–6.5)
NEUTROPHILS NFR BLD AUTO: 80.2 % (ref 42–75)
PLATELET # BLD AUTO: 155 THOU/UL (ref 130–400)
PLATELET # BLD AUTO: 210 THOU/UL (ref 130–400)
PLATELET # BLD AUTO: 236 THOU/UL (ref 130–400)
PLATELET BLD QL SMEAR: (no result)
POTASSIUM SERPL-SCNC: 3.9 MMOL/L (ref 3.5–5.1)
RBC # BLD AUTO: 2.59 MILL/UL (ref 4.7–6.1)
SODIUM SERPL-SCNC: 134 MMOL/L (ref 136–145)
WBC # BLD AUTO: 8.9 THOU/UL (ref 4.8–10.8)

## 2018-05-15 PROCEDURE — B5191ZZ FLUOROSCOPY OF INFERIOR VENA CAVA USING LOW OSMOLAR CONTRAST: ICD-10-PCS | Performed by: THORACIC SURGERY (CARDIOTHORACIC VASCULAR SURGERY)

## 2018-05-15 PROCEDURE — 06H03DZ INSERTION OF INTRALUMINAL DEVICE INTO INFERIOR VENA CAVA, PERCUTANEOUS APPROACH: ICD-10-PCS | Performed by: THORACIC SURGERY (CARDIOTHORACIC VASCULAR SURGERY)

## 2018-05-15 RX ADMIN — Medication SCH ML: at 21:13

## 2018-05-15 RX ADMIN — ASPIRIN SCH MG: 81 TABLET ORAL at 09:48

## 2018-05-15 RX ADMIN — Medication SCH ML: at 09:51

## 2018-05-15 RX ADMIN — MOMETASONE FUROATE AND FORMOTEROL FUMARATE DIHYDRATE SCH: 200; 5 AEROSOL RESPIRATORY (INHALATION) at 07:03

## 2018-05-15 RX ADMIN — MOMETASONE FUROATE AND FORMOTEROL FUMARATE DIHYDRATE SCH: 200; 5 AEROSOL RESPIRATORY (INHALATION) at 18:39

## 2018-05-15 RX ADMIN — MULTIPLE VITAMINS W/ MINERALS TAB SCH TAB: TAB at 09:48

## 2018-05-15 NOTE — PRG
DATE OF SERVICE:  05/15/2018

 

SUBJECTIVE:  Feels sensation of abdominal fullness.  Constipation.  No respiratory symptoms, mild dys
pnea, no back pain.

 

OBJECTIVE:

VITAL SIGNS:  T-max 98.9.

LUNGS:  Symmetric clear breath sounds.

CARDIOVASCULAR:  S1, S2, regular rate without murmurs.

ABDOMEN:  Soft, not tender.

 

LABORATORY DATA:  White cell count 5.8-8.9, hemoglobin 9.9, platelets 155, creatinine 0.62.  Microbio
logy with the Providencia strains.

 

ASSESSMENT AND DISCUSSION:  Alcoholism, chronic hepatitis B infection, chronic liver disease, cirrhos
is, BPH with urosepsis secondary to a different strains of [place] DATE species.

 

DISCUSSION:  The patient most likely will need a transition to oral ciprofloxacin for approximately 2
 weeks and start clear if a voiding trial was attempted may benefit if he failed a voiding trial and 
is not a candidate for any resolution of the obstruction, suprapubic catheter probably will be the be
st solution for him.

## 2018-05-15 NOTE — PDOC.EVN
Event Note





- Event Note


Event Note: 





Called about new scrotal edema. Delay in seeing patient due to acutely ill 

patient in ER needing critical attention. 


Hgb dropped today 9.9-->8.2 s/p IVC filter placement this AM. He reports 

continued abdominal discomfort. Mostly complaining of feeling bloated. He has 

had simethicone and dulcolax [place] and has had 3 small BMs today. Distention 

slightly worsened from exam this AM. No guarding, no rigidity, mild abdominal 

tenderness in RLQ. BS hyperactive diffusely. New scrotal and penis edema on 

exam. Will send for CT abd/pelvis with IV contrast only. Clinical course 

pending image findings. 


Restart fluids at 75 ml/hr.

## 2018-05-15 NOTE — PDOC.FM
- Subjective


Subjective: 





Patient is lying flat after his IVC filter placement procedure this morning. He 

states he has felt "bloated." No abdominal pain, just uncomfortable like he 

would like to have a BM. Otherwise, he has no complaints. 





- Objective


MAR Reviewed: Yes


Vital Signs & Weight: 


 Vital Signs (12 hours)











  Temp Pulse Resp BP Pulse Ox


 


 05/15/18 04:00  98.9 F  92  16  131/81  92 L


 


 05/15/18 00:46   91  16   92 L


 


 05/14/18 20:39  99.1 F  105 H  16   94 L








 Weight











Admit Weight                   56.812 kg


 


Weight                         70.307 kg














I&O: 


 











 05/14/18 05/15/18 05/16/18





 06:59 06:59 06:59


 


Intake Total 1670 2450 


 


Output Total 450 775 


 


Balance 1220 1675 











Result Diagrams: 


 05/15/18 04:51





 05/15/18 04:51


Radiology: 





Repeat ECHO: EF 60-65%, Mild LVH, dilated RV with decreased systolic fxn, 

calcified aortic valve, mod pericardial effusion without evidence of tamponade 





<Shobha Cuellar A - Last Filed: 05/15/18 08:34>





- Objective


Vital Signs & Weight: 


 Vital Signs (12 hours)











  Temp Pulse Resp BP BP Pulse Ox


 


 05/15/18 07:25  98.5 F  98  16   123/74  98


 


 05/15/18 04:00  98.9 F  92  16  131/81   92 L


 


 05/15/18 00:46   91  16    92 L








 Weight











Admit Weight                   56.812 kg


 


Weight                         70.307 kg














I&O: 


 











 05/14/18 05/15/18 05/16/18





 06:59 06:59 06:59


 


Intake Total 1670 2450 


 


Output Total 450 775 


 


Balance 1220 1675 











Result Diagrams: 


 05/15/18 04:51





 05/15/18 04:51





<Hussain Stephens - Last Filed: 05/15/18 10:48>





Phys Exam





- Physical Examination


Constitutional: NAD


HEENT: moist MMs


Respiratory: no rales, no rhonchi, clear to auscultation bilateral


end expiratory wheezing. 


Cardiovascular: RRR, no significant murmur


no rub


Gastrointestinal: soft, non-tender, no distention, positive bowel sounds


1 +pedal edema peg


Neurological: non-focal, moves all 4 limbs


Psychiatric: A&O x 3





<Shobha Cuellar - Last Filed: 05/15/18 08:34>





Dx/Plan


(1) Pulmonary embolism during current hospitalization


Code(s): I26.99 - OTHER PULMONARY EMBOLISM WITHOUT ACUTE COR PULMONALE   Status

: Acute   





(2) Severe sepsis


Code(s): A41.9 - SEPSIS, UNSPECIFIED ORGANISM; R65.20 - SEVERE SEPSIS WITHOUT 

SEPTIC SHOCK   Status: Resolved   





(3) UTI (urinary tract infection) due to urinary indwelling catheter


Code(s): T83.511A - I/I REACT D/T INDWELLING URETHRAL CATHETER, INIT; N39.0 - 

URINARY TRACT INFECTION, SITE NOT SPECIFIED   Status: Suspected   





(4) Transaminitis


Code(s): R74.0 - NONSPEC ELEV OF LEVELS OF TRANSAMNS & LACTIC ACID DEHYDRGNSE   

Status: Acute   





(5) Macrocytic anemia


Code(s): D53.9 - NUTRITIONAL ANEMIA, UNSPECIFIED   Status: Chronic   





(6) Alcohol abuse


Code(s): F10.10 - ALCOHOL ABUSE, UNCOMPLICATED   Status: Chronic   





(7) BPH (benign prostatic hyperplasia)


Code(s): N40.0 - BENIGN PROSTATIC HYPERPLASIA WITHOUT LOWER URINRY TRACT SYMP   

Status: Chronic   





(8) HTN (hypertension)


Code(s): I10 - ESSENTIAL (PRIMARY) HYPERTENSION   Status: Chronic   





(9) Malnourished


Code(s): E46 - UNSPECIFIED PROTEIN-CALORIE MALNUTRITION   Status: Chronic   





(10) Peripheral edema


Code(s): R60.9 - EDEMA, UNSPECIFIED   Status: Chronic   





(11) Acute kidney injury


Code(s): N17.9 - ACUTE KIDNEY FAILURE, UNSPECIFIED   Status: Resolved   





(12) Thrombocytopenia


Code(s): D69.6 - THROMBOCYTOPENIA, UNSPECIFIED   Status: Resolved   





- Plan


Plan: 





77 yr old male who presented to hospital and found to be in sepsis





Bilateral pulmonary embolism


-now s/p IVC filter placement


-anticoagulation contraindicated. 


- will need to stop heparin drip and may D/C per CV surg





sepsis bacteremia 2/2 providencia rettgeri


-now improved


-Cipro day 4 of 14 





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 

indwelling catheter 2/2 severe urinary retention. 





gallbladder sludge


-s/p MRI abdomen with no acute findings


-no futher w/u 





transaminitis


-Resolved


-thought to be 2/2 chronic hepatitis B and alcoholism 





chronic Hep B


-Hep B PCR quant elevated 








thrombocytopenia- improving since starting heparin


-likely 2/2 chronic liver disease/ alcoholism 





malnourished


-supplements provided. 





poor urinary output


-chronic


-kidney function wnl





BPH


-chronic indwelling catheter for severe urinary retention with plan for HH to 

change catheter 1x/ month


-f/u Dr. Oliveira outpt 


-review of Dr. Oliveira's notes suggests a cystocopy has been done in last 4 

months showing 4 cm prostate wtih bilobar hypertrophy and bladder trabeculation 





macrocytic anemia


-b12 and folate wnl 


-likely 2/2 malnutrition 





blindness





alcohol abuse


-cessation counseling





<Shobha Cuellar - Last Filed: 05/15/18 08:34>





Attending Addendum





- Attending Addendum


Date/Time: 05/15/18 4347





I personally evaluated the patient and discussed the management with Dr. Cuellar.


I agree with the History, Examination, Assessment and Plan documented above 

with any addition or exceptions noted below.





Patient is s/p IVC filter placement after his acute on likely chronic pulmonary 

embolism on Sunday. He has been on heparin drip but we can discontinue that and 

transition to ASA per CVsurg recs. He is having some abdominal pain and bloating

, no bowel movement in several days, and so we will work to rectify that. O2 

sats normal on room air. Likely nearing stability for discharge. 








<Hussain Stephens - Last Filed: 05/15/18 10:48>

## 2018-05-15 NOTE — CT
CT ABDOMEN AND PELVIS WITH CONTRAST:

5/15/18

 

HISTORY: 

Low hemoglobin. Distended abdomen. 12 hours after IVC filter placement. 

 

COMPARISON:  

None.

 

FINDINGS:  

There are large bilateral layering pleural effusions. There is extensive third spacing of fluid. A ve
ry large retroperitoneal hematoma involving the right psoas muscle extending into the right posterior
 pararenal space. Hematoma measures 9 x 10 x approximately 20 cm. There is some anterior displacement
 of the right kidney. The urinary bladder wall is thickened. Moderate free fluid in the pelvis. Right
 renal cyst is present. 

 

Moderate free fluid in the pelvis. Mildly distended loops of small bowel. 

 

There is a lumbosacral transitional vertebra with fusion of right L5 transverse process with the sacr
um. Moderate facet arthropathy of the lumbar spine. 

 

IVC filter is in place at the level of the renal veins extending craniad and caudad to the renal vein
s. 

 

IMPRESSION:  

1.      Large right retroperitoneal hematoma involving the iliacus muscle in the right posterior para
renal space extending down to the pelvis. There is a focus of active extravasation within the right i
liacus muscle. 

2.      Extensive third spacing of fluid.

3.      Absence of contrast enhancement of the bilateral femoral arteries although there is contrast 
in the deep femoral arteries. A CT angiogram may be beneficial to evaluate for vascular occlusion guilherme
desire phase of contrast. 

Code CR - Nurse taking are of patient notified of findings at 7:57 p.m.

 

POS: [facility]

## 2018-05-15 NOTE — OP
DATE OF PROCEDURE:  05/15/2018

 

PREOPERATIVE DIAGNOSES:  Deep venous thrombosis/pulmonary embolism with contraindication to anticoagu
lation.

 

POSTOPERATIVE DIAGNOSES:  Deep venous thrombosis/pulmonary embolism with contraindication to anticoag
ulation.

 

PROCEDURES:

1.  Inferior vena cavogram.

2.  Inferior vena cava filter placement - TrapEase placed with its tip at the lower portion of L1.

 

TOTAL FLURO TIME:  0.3 minutes.

 

TOTAL CONTRAST:  10 mL

 

PROCEDURE IN DETAIL:  After consent was obtained, the patient was brought to cath lab, placed in supi
ne position on the cath lab table.  Appropriate monitoring was placed.  Using ultrasound guidance, th
e right groin was anesthetized with 1% lidocaine.  Common femoral vein was then accessed and the guid
ewire passed into the inferior vena cava.  Cavogram catheter was placed at the mid body of L2.  Hand 
injected vena cavogram was performed.  A second vena cavogram was performed with the tip of the radha
ter in the mid body of L1.  Renal veins were located at the mid body of L1.  Tip of the deployment sh
eath was then placed at the lower portion of L1.  The vena cava measured less than 2.5 cm in diameter
.

 

TrapEase vena cava filter was then deployed with its tip at the lower portion of L1.  The filter seat
ed nicely.  Sheath was removed and manual pressure held for hemostasis.  The patient tolerated the pr
ocedure well, and was transferred back to his room in stable condition.

## 2018-05-15 NOTE — PDOC.EVN
Event Note





- Event Note


Event Note: 





CT showed large retroperitoneal hematoma (see report). On call CV surgery and 

General surgery notified. Both have recommended no surgical intervention. Will 

check serial H&H q 4 hrs. Recent Hgb is 7.6, a drop in 4 hours from 8.2. He did 

receive 500 mg IV bolus in that time. Cont IV fluids at 75 ml/hr with low 

threshold to increase rate if Hgb drops. 1 unit PRBC type and crossmatched and 

will transfuse for hgb < 7. Vital signs stable. PTT recently checked and is wnl 

since stopped heparin > 4 hours. Plan discussed with night team and nursing 

staff.

## 2018-05-16 LAB
ALBUMIN SERPL BCG-MCNC: 2.3 G/DL (ref 3.4–4.8)
ALP SERPL-CCNC: 55 U/L (ref 40–150)
ALT SERPL W P-5'-P-CCNC: 22 U/L (ref 8–55)
ANION GAP SERPL CALC-SCNC: 11 MMOL/L (ref 10–20)
ANION GAP SERPL CALC-SCNC: 14 MMOL/L (ref 10–20)
AST SERPL-CCNC: 33 U/L (ref 5–34)
BASOPHILS # BLD AUTO: 0 THOU/UL (ref 0–0.2)
BASOPHILS NFR BLD AUTO: 0 % (ref 0–1)
BILIRUB SERPL-MCNC: 0.5 MG/DL (ref 0.2–1.2)
BUN SERPL-MCNC: 11 MG/DL (ref 8.4–25.7)
BUN SERPL-MCNC: 9 MG/DL (ref 8.4–25.7)
CALCIUM SERPL-MCNC: 7.5 MG/DL (ref 7.8–10.44)
CALCIUM SERPL-MCNC: 7.6 MG/DL (ref 7.8–10.44)
CHLORIDE SERPL-SCNC: 107 MMOL/L (ref 98–107)
CHLORIDE SERPL-SCNC: 108 MMOL/L (ref 98–107)
CO2 SERPL-SCNC: 19 MMOL/L (ref 23–31)
CO2 SERPL-SCNC: 21 MMOL/L (ref 23–31)
CREAT CL PREDICTED SERPL C-G-VRATE: 42 ML/MIN (ref 70–130)
CREAT CL PREDICTED SERPL C-G-VRATE: 49 ML/MIN (ref 70–130)
EOSINOPHIL # BLD AUTO: 0 THOU/UL (ref 0–0.7)
EOSINOPHIL NFR BLD AUTO: 0.1 % (ref 0–10)
GLOBULIN SER CALC-MCNC: 2.5 G/DL (ref 2.4–3.5)
GLUCOSE SERPL-MCNC: 105 MG/DL (ref 83–110)
GLUCOSE SERPL-MCNC: 136 MG/DL (ref 83–110)
HGB BLD-MCNC: 7.1 G/DL (ref 14–18)
HGB BLD-MCNC: 7.3 G/DL (ref 14–18)
HGB BLD-MCNC: 7.3 G/DL (ref 14–18)
HGB BLD-MCNC: 7.6 G/DL (ref 14–18)
HGB BLD-MCNC: 7.9 G/DL (ref 14–18)
LYMPHOCYTES # BLD: 1.1 THOU/UL (ref 1.2–3.4)
LYMPHOCYTES NFR BLD AUTO: 6 % (ref 21–51)
MCH RBC QN AUTO: 37.4 PG (ref 27–31)
MCV RBC AUTO: 113 FL (ref 80–94)
MONOCYTES # BLD AUTO: 1.5 THOU/UL (ref 0.11–0.59)
MONOCYTES NFR BLD AUTO: 7.7 % (ref 0–10)
NEUTROPHILS # BLD AUTO: 16.3 THOU/UL (ref 1.4–6.5)
NEUTROPHILS NFR BLD AUTO: 86.2 % (ref 42–75)
PLATELET # BLD AUTO: 188 THOU/UL (ref 130–400)
PLATELET # BLD AUTO: 194 THOU/UL (ref 130–400)
PLATELET # BLD AUTO: 197 THOU/UL (ref 130–400)
PLATELET # BLD AUTO: 224 THOU/UL (ref 130–400)
PLATELET # BLD AUTO: 233 THOU/UL (ref 130–400)
POTASSIUM SERPL-SCNC: 4.7 MMOL/L (ref 3.5–5.1)
POTASSIUM SERPL-SCNC: 5.2 MMOL/L (ref 3.5–5.1)
RBC # BLD AUTO: 1.9 MILL/UL (ref 4.7–6.1)
SODIUM SERPL-SCNC: 135 MMOL/L (ref 136–145)
SODIUM SERPL-SCNC: 135 MMOL/L (ref 136–145)
WBC # BLD AUTO: 19 THOU/UL (ref 4.8–10.8)

## 2018-05-16 RX ADMIN — ALBUTEROL SULFATE PRN PUFF: 108 AEROSOL, METERED RESPIRATORY (INHALATION) at 08:03

## 2018-05-16 RX ADMIN — MOMETASONE FUROATE AND FORMOTEROL FUMARATE DIHYDRATE SCH PUFF: 200; 5 AEROSOL RESPIRATORY (INHALATION) at 08:05

## 2018-05-16 RX ADMIN — ALBUTEROL SULFATE PRN PUFF: 108 AEROSOL, METERED RESPIRATORY (INHALATION) at 15:14

## 2018-05-16 RX ADMIN — MOMETASONE FUROATE AND FORMOTEROL FUMARATE DIHYDRATE SCH PUFF: 200; 5 AEROSOL RESPIRATORY (INHALATION) at 20:36

## 2018-05-16 RX ADMIN — Medication SCH: at 08:47

## 2018-05-16 RX ADMIN — MULTIPLE VITAMINS W/ MINERALS TAB SCH TAB: TAB at 08:46

## 2018-05-16 NOTE — EKG
Test Reason : STAT

Blood Pressure : ***/*** mmHG

Vent. Rate : 117 BPM     Atrial Rate : 117 BPM

   P-R Int : 000 ms          QRS Dur : 064 ms

    QT Int : 378 ms       P-R-T Axes : 000 075 -89 degrees

   QTc Int : 527 ms

 

Atrial fibrillation with rapid ventricular response

Low voltage QRS

Septal infarct , age undetermined



Abnormal ECG

When compared with ECG of 13-MAY-2018 17:19, (Unconfirmed)

Atrial fibrillation has replaced Sinus rhythm

Nonspecific T wave abnormality, worse in Inferior leads

Nonspecific T wave abnormality now evident in Lateral leads

Confirmed by MARIKA CULLEN (2) on 5/16/2018 8:25:33 PM

 

Referred By:  BERT           Confirmed By:MARIKA CULLEN

## 2018-05-16 NOTE — PDOC.FM
- Subjective


Subjective: 





Patient states he feels a little better this morning in his abdomen. He 

consents to a blood transfusion and medical need has been explained to him. 


He has had multiple BMs. His urine output has decreased. He reports some SOB 

but no worse than the last few days. 





- Objective


MAR Reviewed: Yes


Vital Signs & Weight: 


 Vital Signs (12 hours)











  Temp Pulse Pulse Resp BP BP BP


 


 05/16/18 08:05   97   16   


 


 05/16/18 08:03   97   16   


 


 05/16/18 07:50  98 F   100  20   125/57 L 


 


 05/16/18 07:31  97.9 F   104 H  20   112/84 


 


 05/16/18 04:00  98 F  93   20  111/63   111/63


 


 05/15/18 23:04  97.8 F  113 H   20  103/67   103/67














  Pulse Ox


 


 05/16/18 08:05  98


 


 05/16/18 08:03  98


 


 05/16/18 07:50  100


 


 05/16/18 07:31  100


 


 05/16/18 04:00  100


 


 05/15/18 23:04  99








 Weight











Admit Weight                   56.812 kg


 


Weight                         70.08 kg














I&O: 


 











 05/15/18 05/16/18 05/17/18





 06:59 06:59 06:59


 


Intake Total 2450 2976 0


 


Output Total 775 270 


 


Balance 1675 2706 0











Result Diagrams: 


 05/16/18 05:48





 05/16/18 05:48


EKG Reviewed by me: Yes


Radiology Reviewed by me: Yes (CT- large peritoneal hematoma )





<Shobha Cuellar - Last Filed: 05/16/18 10:00>





- Objective


Vital Signs & Weight: 


 Vital Signs (12 hours)











  Temp Pulse Resp BP BP Pulse Ox


 


 05/17/18 08:35  98.0 F  96  20   


 


 05/17/18 08:30  98.0 F  96  20   119/65  94 L


 


 05/17/18 08:01   89  16    98


 


 05/17/18 03:29  97.0 F L  118 H  17  115/66   100








 Weight











Admit Weight                   56.812 kg


 


Weight                         70.125 kg














I&O: 


 











 05/16/18 05/17/18 05/18/18





 06:59 06:59 06:59


 


Intake Total 2976 1100 


 


Output Total 270 75 


 


Balance 2706 1025 











Result Diagrams: 


 05/17/18 02:49





 05/17/18 02:48





<AlstonOsbaldo cardona - Last Filed: 05/17/18 11:28>





Phys Exam





- Physical Examination


Constitutional: NAD


Respiratory: no wheezing


decreased breath sounds 


Cardiovascular: no significant murmur


tachy, regular rhythm


Gastrointestinal: soft


minimal distention, BS present 


2+ pitting edema in BLE to calves 


Neurological: moves all 4 limbs


Psychiatric: A&O x 3





<Shobha Cuelalr - Last Filed: 05/16/18 10:00>





Dx/Plan


(1) Retroperitoneal hematoma


Code(s): K66.1 - HEMOPERITONEUM   Status: Acute   





(2) Acute blood loss anemia


Code(s): D62 - ACUTE POSTHEMORRHAGIC ANEMIA   Status: Acute   





(3) Pulmonary embolism during current hospitalization


Code(s): I26.99 - OTHER PULMONARY EMBOLISM WITHOUT ACUTE COR PULMONALE   Status

: Acute   





(4) Severe sepsis


Code(s): A41.9 - SEPSIS, UNSPECIFIED ORGANISM; R65.20 - SEVERE SEPSIS WITHOUT 

SEPTIC SHOCK   Status: Resolved   





(5) UTI (urinary tract infection) due to urinary indwelling catheter


Code(s): T83.511A - I/I REACT D/T INDWELLING URETHRAL CATHETER, INIT; N39.0 - 

URINARY TRACT INFECTION, SITE NOT SPECIFIED   Status: Suspected   





(6) Transaminitis


Code(s): R74.0 - NONSPEC ELEV OF LEVELS OF TRANSAMNS & LACTIC ACID DEHYDRGNSE   

Status: Acute   





(7) Macrocytic anemia


Code(s): D53.9 - NUTRITIONAL ANEMIA, UNSPECIFIED   Status: Chronic   





(8) Alcohol abuse


Code(s): F10.10 - ALCOHOL ABUSE, UNCOMPLICATED   Status: Chronic   





(9) BPH (benign prostatic hyperplasia)


Code(s): N40.0 - BENIGN PROSTATIC HYPERPLASIA WITHOUT LOWER URINRY TRACT SYMP   

Status: Chronic   





(10) HTN (hypertension)


Code(s): I10 - ESSENTIAL (PRIMARY) HYPERTENSION   Status: Chronic   





(11) Malnourished


Code(s): E46 - UNSPECIFIED PROTEIN-CALORIE MALNUTRITION   Status: Chronic   





(12) Peripheral edema


Code(s): R60.9 - EDEMA, UNSPECIFIED   Status: Chronic   





(13) Acute kidney injury


Code(s): N17.9 - ACUTE KIDNEY FAILURE, UNSPECIFIED   Status: Resolved   





(14) Thrombocytopenia


Code(s): D69.6 - THROMBOCYTOPENIA, UNSPECIFIED   Status: Resolved   





- Plan


Plan: 





77 yr old male who presented to hospital and found to be in sepsis





Retroperitoneal Hematoma


-transfusing 1 unit PRBC as hgb has slowly decreased from 9.9 to 7.1 in 24 

hours. 


-continue to monitor serial H&H s/p blood transfusion 


-no surgical intervention recommended at this time 





elevated WBC


-tachycardic however also acute blood loss


-afebrile


-on cipro for UTI


-cont to monitor and low threshold for broad abx if concern for infection 

develops 





YEHUAD


-decreased urinary output which has been a chronic issue for this patient. 


-will reach out to urology for recommendations


-concern for poor profusion getting transfusion now 





hyperkalemia


-monitor with repeat BMP this afternoon 





Bilateral pulmonary embolism


-now s/p IVC filter placement


-anticoagulation contraindicated. 





sepsis bacteremia 2/2 providencia rettgeri


-now improved


-Cipro day 5 of 14 





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 

indwelling catheter 2/2 severe urinary retention. 





gallbladder sludge


-s/p MRI abdomen with no acute findings


-no futher w/u 





transaminitis


-Resolved


-thought to be 2/2 chronic hepatitis B and alcoholism 





chronic Hep B


-Hep B PCR quant elevated 





thrombocytopenia-


-may have been 2/2 sepsis as plt count improved dramatically 





malnourished


-supplements provided. 





BPH


-chronic indwelling catheter for severe urinary retention with plan for HH to 

change catheter 1x/ month


-f/u Dr. Oliveira outpt 


-review of Dr. Oliveira's notes suggests a cystocopy has been done in last 4 

months showing 4 cm prostate wtih bilobar hypertrophy and bladder trabeculation 





macrocytic anemia


-b12 and folate wnl 


-likely 2/2 malnutrition 





blindness





alcohol abuse


-cessation counseling








<Shobha Cuellar - Last Filed: 05/16/18 10:00>





Attending Addendum





- Attending Addendum


Date/Time: 05/17/18 1125





I personally evaluated the patient and discussed the management with Dr. Cuellar


I agree with the History, Examination, Assessment and Plan documented above 

with any addition or exceptions noted below.


Scrotal edema and BLE edema.  decreased UOP.  Right retroperitoneal hematoma.  H

&H drop.  Transfused 1U PRBC's.  Vitals stable.  If continues to drop, 

transfuse and reassess Hematoma for expansion.





<Osbaldo Alston - Last Filed: 05/17/18 11:28>

## 2018-05-17 LAB
ALBUMIN SERPL BCG-MCNC: 2.3 G/DL (ref 3.4–4.8)
ALP SERPL-CCNC: 52 U/L (ref 40–150)
ALT SERPL W P-5'-P-CCNC: 19 U/L (ref 8–55)
ANION GAP SERPL CALC-SCNC: 11 MMOL/L (ref 10–20)
ANION GAP SERPL CALC-SCNC: 8 MMOL/L (ref 10–20)
AST SERPL-CCNC: 32 U/L (ref 5–34)
BASOPHILS # BLD AUTO: 0 THOU/UL (ref 0–0.2)
BASOPHILS # BLD AUTO: 0 THOU/UL (ref 0–0.2)
BASOPHILS NFR BLD AUTO: 0 % (ref 0–1)
BASOPHILS NFR BLD AUTO: 0.2 % (ref 0–1)
BILIRUB SERPL-MCNC: 0.5 MG/DL (ref 0.2–1.2)
BUN SERPL-MCNC: 15 MG/DL (ref 8.4–25.7)
BUN SERPL-MCNC: 15 MG/DL (ref 8.4–25.7)
CALCIUM SERPL-MCNC: 7.6 MG/DL (ref 7.8–10.44)
CALCIUM SERPL-MCNC: 7.6 MG/DL (ref 7.8–10.44)
CHLORIDE SERPL-SCNC: 108 MMOL/L (ref 98–107)
CHLORIDE SERPL-SCNC: 108 MMOL/L (ref 98–107)
CO2 SERPL-SCNC: 22 MMOL/L (ref 23–31)
CO2 SERPL-SCNC: 23 MMOL/L (ref 23–31)
CREAT CL PREDICTED SERPL C-G-VRATE: 41 ML/MIN (ref 70–130)
CREAT CL PREDICTED SERPL C-G-VRATE: 46 ML/MIN (ref 70–130)
EOSINOPHIL # BLD AUTO: 0 THOU/UL (ref 0–0.7)
EOSINOPHIL # BLD AUTO: 0.1 THOU/UL (ref 0–0.7)
EOSINOPHIL NFR BLD AUTO: 0.3 % (ref 0–10)
EOSINOPHIL NFR BLD AUTO: 0.7 % (ref 0–10)
GLOBULIN SER CALC-MCNC: 2.4 G/DL (ref 2.4–3.5)
GLUCOSE SERPL-MCNC: 88 MG/DL (ref 83–110)
GLUCOSE SERPL-MCNC: 88 MG/DL (ref 83–110)
HGB BLD-MCNC: 7.2 G/DL (ref 14–18)
HGB BLD-MCNC: 7.4 G/DL (ref 14–18)
LYMPHOCYTES # BLD: 0.7 THOU/UL (ref 1.2–3.4)
LYMPHOCYTES # BLD: 0.7 THOU/UL (ref 1.2–3.4)
LYMPHOCYTES NFR BLD AUTO: 4.6 % (ref 21–51)
LYMPHOCYTES NFR BLD AUTO: 5.1 % (ref 21–51)
MCH RBC QN AUTO: 36.2 PG (ref 27–31)
MCH RBC QN AUTO: 36.3 PG (ref 27–31)
MCV RBC AUTO: 106 FL (ref 80–94)
MCV RBC AUTO: 108 FL (ref 80–94)
MONOCYTES # BLD AUTO: 0.9 THOU/UL (ref 0.11–0.59)
MONOCYTES # BLD AUTO: 1.1 THOU/UL (ref 0.11–0.59)
MONOCYTES NFR BLD AUTO: 6.7 % (ref 0–10)
MONOCYTES NFR BLD AUTO: 7.2 % (ref 0–10)
NEUTROPHILS # BLD AUTO: 12.1 THOU/UL (ref 1.4–6.5)
NEUTROPHILS # BLD AUTO: 13.5 THOU/UL (ref 1.4–6.5)
NEUTROPHILS NFR BLD AUTO: 87.5 % (ref 42–75)
NEUTROPHILS NFR BLD AUTO: 87.8 % (ref 42–75)
PLATELET # BLD AUTO: 192 THOU/UL (ref 130–400)
PLATELET # BLD AUTO: 207 THOU/UL (ref 130–400)
POTASSIUM SERPL-SCNC: 4.3 MMOL/L (ref 3.5–5.1)
POTASSIUM SERPL-SCNC: 4.5 MMOL/L (ref 3.5–5.1)
RBC # BLD AUTO: 2 MILL/UL (ref 4.7–6.1)
RBC # BLD AUTO: 2.05 MILL/UL (ref 4.7–6.1)
SODIUM SERPL-SCNC: 135 MMOL/L (ref 136–145)
SODIUM SERPL-SCNC: 136 MMOL/L (ref 136–145)
SODIUM UR-SCNC: 35 MMOL/L
WBC # BLD AUTO: 13.8 THOU/UL (ref 4.8–10.8)
WBC # BLD AUTO: 14.9 THOU/UL (ref 4.8–10.8)

## 2018-05-17 RX ADMIN — Medication SCH: at 02:10

## 2018-05-17 RX ADMIN — MOMETASONE FUROATE AND FORMOTEROL FUMARATE DIHYDRATE SCH PUFF: 200; 5 AEROSOL RESPIRATORY (INHALATION) at 19:40

## 2018-05-17 RX ADMIN — MULTIPLE VITAMINS W/ MINERALS TAB SCH TAB: TAB at 08:36

## 2018-05-17 RX ADMIN — MOMETASONE FUROATE AND FORMOTEROL FUMARATE DIHYDRATE SCH PUFF: 200; 5 AEROSOL RESPIRATORY (INHALATION) at 08:01

## 2018-05-17 RX ADMIN — Medication SCH: at 08:37

## 2018-05-17 RX ADMIN — ALBUTEROL SULFATE PRN PUFF: 108 AEROSOL, METERED RESPIRATORY (INHALATION) at 13:24

## 2018-05-17 RX ADMIN — Medication SCH ML: at 20:42

## 2018-05-17 RX ADMIN — ALBUTEROL SULFATE PRN PUFF: 108 AEROSOL, METERED RESPIRATORY (INHALATION) at 08:01

## 2018-05-17 NOTE — PDOC.FM
- Subjective


Subjective: 





pt states he is feeling better this morning. He does c/o occasional dull pain 

in RLQ. He ate a little food this AM. otherwise no complaints or changes. 





- Objective


MAR Reviewed: Yes


Vital Signs & Weight: 


 Vital Signs (12 hours)











  Temp Pulse Resp BP BP Pulse Ox


 


 05/17/18 08:30  98.0 F  96  20   119/65  94 L


 


 05/17/18 08:01   89  16    98


 


 05/17/18 03:29  97.0 F L  118 H  17  115/66   100








 Weight











Admit Weight                   56.812 kg


 


Weight                         70.125 kg














I&O: 


 











 05/16/18 05/17/18 05/18/18





 06:59 06:59 06:59


 


Intake Total 2976 1100 


 


Output Total 270 75 


 


Balance 2706 1025 











Result Diagrams: 


 05/17/18 02:49





 05/17/18 02:48





<Shobha Cuellar - Last Filed: 05/17/18 10:53>





- Objective


Vital Signs & Weight: 


 Vital Signs (12 hours)











  Temp Pulse Resp BP BP Pulse Ox


 


 05/17/18 08:35  98.0 F  96  20   


 


 05/17/18 08:30  98.0 F  96  20   119/65  94 L


 


 05/17/18 08:01   89  16    98


 


 05/17/18 03:29  97.0 F L  118 H  17  115/66   100








 Weight











Admit Weight                   56.812 kg


 


Weight                         70.125 kg














I&O: 


 











 05/16/18 05/17/18 05/18/18





 06:59 06:59 06:59


 


Intake Total 2976 1100 


 


Output Total 270 75 


 


Balance 2706 1025 











Result Diagrams: 


 05/17/18 02:49





 05/17/18 02:48





<Osbaldo Alston - Last Filed: 05/17/18 11:30>





Phys Exam





- Physical Examination


Constitutional: NAD


anterior chest wall wheezing, decreased breath sound bilaterally LL 


Cardiovascular: RRR, no significant murmur


distant faint heart sounds 


Gastrointestinal: soft, non-tender, positive bowel sounds


2+ pitting edema in BLE 


Psychiatric: normal affect, A&O x 3





<Shobha Cuellar - Last Filed: 05/17/18 10:53>





Dx/Plan


(1) Retroperitoneal hematoma


Code(s): K66.1 - HEMOPERITONEUM   Status: Acute   





(2) Acute blood loss anemia


Code(s): D62 - ACUTE POSTHEMORRHAGIC ANEMIA   Status: Acute   





(3) Pulmonary embolism during current hospitalization


Code(s): I26.99 - OTHER PULMONARY EMBOLISM WITHOUT ACUTE COR PULMONALE   Status

: Acute   





(4) Severe sepsis


Code(s): A41.9 - SEPSIS, UNSPECIFIED ORGANISM; R65.20 - SEVERE SEPSIS WITHOUT 

SEPTIC SHOCK   Status: Resolved   





(5) UTI (urinary tract infection) due to urinary indwelling catheter


Code(s): T83.511A - I/I REACT D/T INDWELLING URETHRAL CATHETER, INIT; N39.0 - 

URINARY TRACT INFECTION, SITE NOT SPECIFIED   Status: Suspected   





(6) Transaminitis


Code(s): R74.0 - NONSPEC ELEV OF LEVELS OF TRANSAMNS & LACTIC ACID DEHYDRGNSE   

Status: Acute   





(7) Macrocytic anemia


Code(s): D53.9 - NUTRITIONAL ANEMIA, UNSPECIFIED   Status: Chronic   





(8) Alcohol abuse


Code(s): F10.10 - ALCOHOL ABUSE, UNCOMPLICATED   Status: Chronic   





(9) BPH (benign prostatic hyperplasia)


Code(s): N40.0 - BENIGN PROSTATIC HYPERPLASIA WITHOUT LOWER URINRY TRACT SYMP   

Status: Chronic   





(10) HTN (hypertension)


Code(s): I10 - ESSENTIAL (PRIMARY) HYPERTENSION   Status: Chronic   





(11) Malnourished


Code(s): E46 - UNSPECIFIED PROTEIN-CALORIE MALNUTRITION   Status: Chronic   





(12) Peripheral edema


Code(s): R60.9 - EDEMA, UNSPECIFIED   Status: Chronic   





(13) Acute kidney injury


Code(s): N17.9 - ACUTE KIDNEY FAILURE, UNSPECIFIED   Status: Resolved   





(14) Thrombocytopenia


Code(s): D69.6 - THROMBOCYTOPENIA, UNSPECIFIED   Status: Resolved   





- Plan


Plan: 





77 yr old male who presented to hospital and found to be in sepsis





Retroperitoneal Hematoma


-s/p 1 unit PRBC


-hgb stabalized at 7.4


-continue to monitor H&H daily  


-no surgical intervention recommended at this time 





elevated WBC- improving 


-tachycardic however also acute blood loss


-afebrile


-on cipro for UTI


-cont to monitor and low threshold for broad abx if concern for infection 

develops 





YEHUDA


-urine outpuit has improved in last 12 hours. Willl recheck BMP and if worsened 

will consult nephro


-check FENA





hyperkalemia


-RESOLVED 





Bilateral pulmonary embolism


-now s/p IVC filter placement


-anticoagulation contraindicated. 





sepsis bacteremia 2/2 providencia rettgeri


-now improved


-Cipro day 6 of 14 





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 

indwelling catheter 2/2 severe urinary retention. 





gallbladder sludge


-s/p MRI abdomen with no acute findings


-no futher w/u 





transaminitis


-Resolved


-thought to be 2/2 chronic hepatitis B and alcoholism 





chronic Hep B


-Hep B PCR quant elevated 


-Outpt followup 





thrombocytopenia-


-may have been 2/2 sepsis as plt count improved dramatically 





malnourished


-supplements provided. 





BPH


-chronic indwelling catheter for severe urinary retention with plan for HH to 

change catheter 1x/ month


-f/u Dr. Oliveira outpt 


-review of Dr. Oliveira's notes suggests a cystocopy has been done in last 4 

months showing 4 cm prostate wtih bilobar hypertrophy and bladder trabeculation 





macrocytic anemia


-b12 and folate wnl 


-likely 2/2 malnutrition 





blindness





alcohol abuse


-cessation counseling





<Shobha Cuellar - Last Filed: 05/17/18 10:53>





Attending Addendum





- Attending Addendum


Date/Time: 05/17/18 4704





I personally evaluated the patient and discussed the management with Dr. Cuellar.


I agree with the History, Examination, Assessment and Plan documented above 

with any addition or exceptions noted below.


Vitals stable.  BLE edema unchanged.  Creatinine and H&H stabilized  UOP 

improving this a.m.  Trial of Diuretic to mobilize fluid now that seems to be 

trending toward improvement.





<Osbaldo Alston - Last Filed: 05/17/18 11:30>

## 2018-05-18 LAB
ALBUMIN SERPL BCG-MCNC: 2.4 G/DL (ref 3.4–4.8)
ALP SERPL-CCNC: 55 U/L (ref 40–150)
ALT SERPL W P-5'-P-CCNC: 19 U/L (ref 8–55)
ANION GAP SERPL CALC-SCNC: 11 MMOL/L (ref 10–20)
AST SERPL-CCNC: 32 U/L (ref 5–34)
BASOPHILS # BLD AUTO: 0 THOU/UL (ref 0–0.2)
BASOPHILS NFR BLD AUTO: 0.1 % (ref 0–1)
BILIRUB SERPL-MCNC: 0.5 MG/DL (ref 0.2–1.2)
BUN SERPL-MCNC: 16 MG/DL (ref 8.4–25.7)
CALCIUM SERPL-MCNC: 7.8 MG/DL (ref 7.8–10.44)
CHLORIDE SERPL-SCNC: 106 MMOL/L (ref 98–107)
CO2 SERPL-SCNC: 23 MMOL/L (ref 23–31)
CREAT CL PREDICTED SERPL C-G-VRATE: 53 ML/MIN (ref 70–130)
EOSINOPHIL # BLD AUTO: 0.1 THOU/UL (ref 0–0.7)
EOSINOPHIL NFR BLD AUTO: 0.7 % (ref 0–10)
GLOBULIN SER CALC-MCNC: 2.5 G/DL (ref 2.4–3.5)
GLUCOSE SERPL-MCNC: 75 MG/DL (ref 83–110)
HGB BLD-MCNC: 7.7 G/DL (ref 14–18)
LYMPHOCYTES # BLD: 0.9 THOU/UL (ref 1.2–3.4)
LYMPHOCYTES NFR BLD AUTO: 6.3 % (ref 21–51)
MCH RBC QN AUTO: 35.4 PG (ref 27–31)
MCV RBC AUTO: 108 FL (ref 80–94)
MONOCYTES # BLD AUTO: 0.9 THOU/UL (ref 0.11–0.59)
MONOCYTES NFR BLD AUTO: 6.3 % (ref 0–10)
NEUTROPHILS # BLD AUTO: 12.5 THOU/UL (ref 1.4–6.5)
NEUTROPHILS NFR BLD AUTO: 86.6 % (ref 42–75)
PLATELET # BLD AUTO: 251 THOU/UL (ref 130–400)
POTASSIUM SERPL-SCNC: 4.6 MMOL/L (ref 3.5–5.1)
RBC # BLD AUTO: 2.17 MILL/UL (ref 4.7–6.1)
SODIUM SERPL-SCNC: 135 MMOL/L (ref 136–145)
WBC # BLD AUTO: 14.4 THOU/UL (ref 4.8–10.8)

## 2018-05-18 RX ADMIN — MOMETASONE FUROATE AND FORMOTEROL FUMARATE DIHYDRATE SCH PUFF: 200; 5 AEROSOL RESPIRATORY (INHALATION) at 09:58

## 2018-05-18 RX ADMIN — Medication SCH ML: at 20:32

## 2018-05-18 RX ADMIN — ALBUTEROL SULFATE PRN PUFF: 108 AEROSOL, METERED RESPIRATORY (INHALATION) at 09:57

## 2018-05-18 RX ADMIN — ALBUTEROL SULFATE PRN PUFF: 108 AEROSOL, METERED RESPIRATORY (INHALATION) at 09:55

## 2018-05-18 RX ADMIN — MULTIPLE VITAMINS W/ MINERALS TAB SCH TAB: TAB at 08:55

## 2018-05-18 RX ADMIN — ALBUTEROL SULFATE PRN PUFF: 108 AEROSOL, METERED RESPIRATORY (INHALATION) at 14:07

## 2018-05-18 RX ADMIN — Medication SCH ML: at 08:55

## 2018-05-18 RX ADMIN — ALBUTEROL SULFATE PRN PUFF: 108 AEROSOL, METERED RESPIRATORY (INHALATION) at 14:08

## 2018-05-18 RX ADMIN — MOMETASONE FUROATE AND FORMOTEROL FUMARATE DIHYDRATE SCH PUFF: 200; 5 AEROSOL RESPIRATORY (INHALATION) at 18:35

## 2018-05-18 NOTE — PDOC.FM
- Subjective


Subjective: 





Patient says he feels better today than entire hospitalization. Only having 

pain when trying to sit up so he prefers to lay flat. He is tolerating PO but 

does not want to eat much. Tolerating liquids. 





Denies nausea, vomiting, abdominal pain.


States breathing is about the same. 





- Objective


Vital Signs & Weight: 


 Vital Signs (12 hours)











  Temp Pulse Resp BP Pulse Ox


 


 05/18/18 04:34      98


 


 05/18/18 04:32  98.6 F  117 H  20  109/64  98


 


 05/17/18 20:08  98.5 F  116 H  20   100


 


 05/17/18 19:51  98.5 F  116 H  20  127/61  100


 


 05/17/18 19:40   91  16   99








 Weight











Admit Weight                   56.812 kg


 


Weight                         70.488 kg














I&O: 


 











 05/16/18 05/17/18 05/18/18





 06:59 06:59 06:59


 


Intake Total 2976 1100 1000


 


Output Total 970 03 8220


 


Balance 2706 1025 -1025











Result Diagrams: 


 05/18/18 04:32





 05/18/18 04:32





<Shobha Cuellar A - Last Filed: 05/18/18 09:32>





- Objective


Vital Signs & Weight: 


 Vital Signs (12 hours)











  Temp Pulse Resp BP BP Pulse Ox


 


 05/18/18 09:58   115 H  16   


 


 05/18/18 09:55   115 H  16   


 


 05/18/18 07:50  97.8 F  113 H  18  115/60   100


 


 05/18/18 04:34       98


 


 05/18/18 04:32  98.6 F  117 H  20   109/64  98








 Weight











Admit Weight                   56.812 kg


 


Weight                         70.488 kg














I&O: 


 











 05/17/18 05/18/18 05/19/18





 06:59 06:59 06:59


 


Intake Total 1100 1000 


 


Output Total 75 2025 


 


Balance 1025 -1025 











Result Diagrams: 


 05/18/18 04:32





 05/18/18 04:32





<Osbaldo Alston A - Last Filed: 05/18/18 11:11>





Phys Exam





- Physical Examination


Constitutional: NAD


HEENT: moist MMs


Respiratory: no wheezing, no rales, clear to auscultation bilateral


decreased breath sound in BLL 


Cardiovascular: RRR, no significant murmur


Gastrointestinal: soft, non-tender, no distention, positive bowel sounds


1-2+ pitting edema BLE


Neurological: non-focal


Psychiatric: normal affect, A&O x 3





<Shobha Cuellar - Last Filed: 05/18/18 09:32>





Dx/Plan


(1) Retroperitoneal hematoma


Code(s): K66.1 - HEMOPERITONEUM   Status: Acute   





(2) Acute blood loss anemia


Code(s): D62 - ACUTE POSTHEMORRHAGIC ANEMIA   Status: Acute   





(3) Pulmonary embolism during current hospitalization


Code(s): I26.99 - OTHER PULMONARY EMBOLISM WITHOUT ACUTE COR PULMONALE   Status

: Acute   





(4) Severe sepsis


Code(s): A41.9 - SEPSIS, UNSPECIFIED ORGANISM; R65.20 - SEVERE SEPSIS WITHOUT 

SEPTIC SHOCK   Status: Resolved   





(5) UTI (urinary tract infection) due to urinary indwelling catheter


Code(s): T83.511A - I/I REACT D/T INDWELLING URETHRAL CATHETER, INIT; N39.0 - 

URINARY TRACT INFECTION, SITE NOT SPECIFIED   Status: Suspected   





(6) Transaminitis


Code(s): R74.0 - NONSPEC ELEV OF LEVELS OF TRANSAMNS & LACTIC ACID DEHYDRGNSE   

Status: Acute   





(7) Macrocytic anemia


Code(s): D53.9 - NUTRITIONAL ANEMIA, UNSPECIFIED   Status: Chronic   





(8) Alcohol abuse


Code(s): F10.10 - ALCOHOL ABUSE, UNCOMPLICATED   Status: Chronic   





(9) BPH (benign prostatic hyperplasia)


Code(s): N40.0 - BENIGN PROSTATIC HYPERPLASIA WITHOUT LOWER URINRY TRACT SYMP   

Status: Chronic   





(10) HTN (hypertension)


Code(s): I10 - ESSENTIAL (PRIMARY) HYPERTENSION   Status: Chronic   





(11) Malnourished


Code(s): E46 - UNSPECIFIED PROTEIN-CALORIE MALNUTRITION   Status: Chronic   





(12) Peripheral edema


Code(s): R60.9 - EDEMA, UNSPECIFIED   Status: Chronic   





(13) Acute kidney injury


Code(s): N17.9 - ACUTE KIDNEY FAILURE, UNSPECIFIED   Status: Resolved   





(14) Thrombocytopenia


Code(s): D69.6 - THROMBOCYTOPENIA, UNSPECIFIED   Status: Resolved   





- Plan


Plan: 





77 yr old male who presented to hospital and found to be in sepsis





Retroperitoneal Hematoma


-s/p 1 unit PRBC


-hgb improved from 7.2 to 7.7 in last 24 hours


-continue to monitor H&H daily  


-no surgical intervention recommended at this time 





acute blood loss anemia on macrocytic anemia


-b12 and folate wnl 


-see retroperitoneal hematoma  





elevated WBC- improving 


-tachycardic however also acute blood loss


-afebrile


-on cipro for UTI


-cont to monitor and low threshold for broad abx if concern for infection 

develops 





YEHUDA- resolving 


-FENA on 5/17 suggesting pre renal cause


-UOP improved in last 24 hours albeit given lasix


-cont to monitor daily BMP 





chronic Hep B


-Hep B PCR quant elevated 


-Outpt followup 





malnourished


-supplements provided.


-poor albumin 





BPH


-chronic indwelling catheter for severe urinary retention with plan for HH to 

change catheter 1x/ month


-f/u Dr. Oliveira outpt 


-review of Dr. Oliveira's notes suggests a cystocopy has been done in last 4 

months showing 4 cm prostate wtih bilobar hypertrophy and bladder trabeculation 





hyperkalemia


-RESOLVED 





Bilateral pulmonary embolism


-now s/p IVC filter placement


-anticoagulation contraindicated. 





sepsis bacteremia 2/2 providencia rettgeri


-now improved


-Cipro day 7 of 14 





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 

indwelling catheter 2/2 severe urinary retention. 


-recommend outpatient follow up with urology to consider suprapubic catheter. 





gallbladder sludge


-s/p MRI abdomen with no acute findings


-no futher w/u 





transaminitis


-Resolved


-thought to be 2/2 chronic hepatitis B and alcoholism 





thrombocytopenia-RESOLVED





blindness





alcohol abuse


-cessation counseling





may be ready for discharge tomorrow. 





<Shobha Cuellar - Last Filed: 05/18/18 09:32>





Attending Addendum





- Attending Addendum


Date/Time: 05/18/18 1103





I personally evaluated the patient and discussed the management with Dr. Cuellar


I agree with the History, Examination, Assessment and Plan documented above 

with any addition or exceptions noted below.


Hemoglobin and kidney function stabilized. Diuresing well with lasix. Arranging 

outpatient follow up at this time. Patient offered placement at rehab or SNF 

but preferred to go home. Plan for discharge tomorrow to monitor vitals and 

labs overnight. 





<Osbaldo Alston - Last Filed: 05/18/18 11:11>

## 2018-05-19 LAB
ALBUMIN SERPL BCG-MCNC: 2.2 G/DL (ref 3.4–4.8)
ALP SERPL-CCNC: 52 U/L (ref 40–150)
ALT SERPL W P-5'-P-CCNC: 15 U/L (ref 8–55)
ANION GAP SERPL CALC-SCNC: 5 MMOL/L (ref 10–20)
AST SERPL-CCNC: 24 U/L (ref 5–34)
BASOPHILS # BLD AUTO: 0 THOU/UL (ref 0–0.2)
BASOPHILS NFR BLD AUTO: 0.1 % (ref 0–1)
BILIRUB SERPL-MCNC: 0.6 MG/DL (ref 0.2–1.2)
BUN SERPL-MCNC: 13 MG/DL (ref 8.4–25.7)
CALCIUM SERPL-MCNC: 7.9 MG/DL (ref 7.8–10.44)
CHLORIDE SERPL-SCNC: 104 MMOL/L (ref 98–107)
CO2 SERPL-SCNC: 31 MMOL/L (ref 23–31)
CREAT CL PREDICTED SERPL C-G-VRATE: 62 ML/MIN (ref 70–130)
EOSINOPHIL # BLD AUTO: 0.2 THOU/UL (ref 0–0.7)
EOSINOPHIL NFR BLD AUTO: 1.7 % (ref 0–10)
GLOBULIN SER CALC-MCNC: 2.4 G/DL (ref 2.4–3.5)
GLUCOSE SERPL-MCNC: 94 MG/DL (ref 83–110)
HGB BLD-MCNC: 7.1 G/DL (ref 14–18)
HGB BLD-MCNC: 8.9 G/DL (ref 14–18)
LYMPHOCYTES # BLD: 0.8 THOU/UL (ref 1.2–3.4)
LYMPHOCYTES NFR BLD AUTO: 7.4 % (ref 21–51)
MCH RBC QN AUTO: 37.1 PG (ref 27–31)
MCV RBC AUTO: 108 FL (ref 80–94)
MONOCYTES # BLD AUTO: 0.9 THOU/UL (ref 0.11–0.59)
MONOCYTES NFR BLD AUTO: 8.1 % (ref 0–10)
NEUTROPHILS # BLD AUTO: 8.8 THOU/UL (ref 1.4–6.5)
NEUTROPHILS NFR BLD AUTO: 82.7 % (ref 42–75)
PLATELET # BLD AUTO: 215 THOU/UL (ref 130–400)
PLATELET # BLD AUTO: 245 THOU/UL (ref 130–400)
POTASSIUM SERPL-SCNC: 4.1 MMOL/L (ref 3.5–5.1)
RBC # BLD AUTO: 1.92 MILL/UL (ref 4.7–6.1)
SODIUM SERPL-SCNC: 136 MMOL/L (ref 136–145)
WBC # BLD AUTO: 10.6 THOU/UL (ref 4.8–10.8)

## 2018-05-19 RX ADMIN — ALBUTEROL SULFATE PRN PUFF: 108 AEROSOL, METERED RESPIRATORY (INHALATION) at 07:53

## 2018-05-19 RX ADMIN — ALBUTEROL SULFATE PRN PUFF: 108 AEROSOL, METERED RESPIRATORY (INHALATION) at 07:52

## 2018-05-19 RX ADMIN — ALBUTEROL SULFATE PRN PUFF: 108 AEROSOL, METERED RESPIRATORY (INHALATION) at 14:16

## 2018-05-19 RX ADMIN — Medication SCH ML: at 08:22

## 2018-05-19 RX ADMIN — MOMETASONE FUROATE AND FORMOTEROL FUMARATE DIHYDRATE SCH PUFF: 200; 5 AEROSOL RESPIRATORY (INHALATION) at 07:54

## 2018-05-19 RX ADMIN — MULTIPLE VITAMINS W/ MINERALS TAB SCH TAB: TAB at 08:21

## 2018-05-19 RX ADMIN — Medication SCH ML: at 20:30

## 2018-05-19 RX ADMIN — MOMETASONE FUROATE AND FORMOTEROL FUMARATE DIHYDRATE SCH PUFF: 200; 5 AEROSOL RESPIRATORY (INHALATION) at 19:37

## 2018-05-19 RX ADMIN — ALBUTEROL SULFATE PRN PUFF: 108 AEROSOL, METERED RESPIRATORY (INHALATION) at 14:15

## 2018-05-19 NOTE — PDOC.FM
- Subjective


Subjective: 





Patient states he feels fine today. If he sits upright, he feels pressure from 

his abdomen causing it to be difficult to breath. He notes breathing be just a 

bit more difficult than when he originally came to hospital but was not on 

oxygen at home. He does have COPD but only used symbicort at home. He has been 

denying the duonebs here bc he thinks it made his breathing worse. He has taken 

some ensure overnight and has this at home which is where he gets the largest 

portion of his nutrition.  





- Objective


MAR Reviewed: Yes


Vital Signs & Weight: 


 Vital Signs (12 hours)











  Temp Pulse Resp BP Pulse Ox


 


 05/19/18 04:00  98.9 F  110 H  18  105/57 L  99


 


 05/19/18 00:00  98.6 F  85  16  95/52 L  100


 


 05/18/18 20:00  98.8 F  92  20  115/56 L  100








 Weight











Admit Weight                   56.812 kg


 


Weight                         66.723 kg














I&O: 


 











 05/18/18 05/19/18 05/20/18





 06:59 06:59 06:59


 


Intake Total 1000 1267 


 


Output Total 2025 3600 


 


Balance -1020 -3006 











Result Diagrams: 


 05/19/18 04:38





 05/19/18 04:38





Phys Exam





- Physical Examination


Constitutional: NAD


HEENT: moist MMs


Respiratory: no wheezing, no rales, no rhonchi, clear to auscultation bilateral


decreased breath sound posterior lower lobes


Cardiovascular: no significant murmur


regular rhythm, tachy 


Gastrointestinal: soft, non-tender, no distention


trace to 1+ edema in feet bilaterally


Psychiatric: normal affect, A&O x 3


Skin: cap refill <2 seconds





Dx/Plan


(1) Retroperitoneal hematoma


Code(s): K66.1 - HEMOPERITONEUM   Status: Acute   





(2) Acute blood loss anemia


Code(s): D62 - ACUTE POSTHEMORRHAGIC ANEMIA   Status: Acute   





(3) Pulmonary embolism during current hospitalization


Code(s): I26.99 - OTHER PULMONARY EMBOLISM WITHOUT ACUTE COR PULMONALE   Status

: Acute   





(4) Severe sepsis


Code(s): A41.9 - SEPSIS, UNSPECIFIED ORGANISM; R65.20 - SEVERE SEPSIS WITHOUT 

SEPTIC SHOCK   Status: Resolved   





(5) UTI (urinary tract infection) due to urinary indwelling catheter


Code(s): T83.511A - I/I REACT D/T INDWELLING URETHRAL CATHETER, INIT; N39.0 - 

URINARY TRACT INFECTION, SITE NOT SPECIFIED   Status: Suspected   





(6) Transaminitis


Code(s): R74.0 - NONSPEC ELEV OF LEVELS OF TRANSAMNS & LACTIC ACID DEHYDRGNSE   

Status: Acute   





(7) Macrocytic anemia


Code(s): D53.9 - NUTRITIONAL ANEMIA, UNSPECIFIED   Status: Chronic   





(8) Alcohol abuse


Code(s): F10.10 - ALCOHOL ABUSE, UNCOMPLICATED   Status: Chronic   





(9) BPH (benign prostatic hyperplasia)


Code(s): N40.0 - BENIGN PROSTATIC HYPERPLASIA WITHOUT LOWER URINRY TRACT SYMP   

Status: Chronic   





(10) HTN (hypertension)


Code(s): I10 - ESSENTIAL (PRIMARY) HYPERTENSION   Status: Chronic   





(11) Malnourished


Code(s): E46 - UNSPECIFIED PROTEIN-CALORIE MALNUTRITION   Status: Chronic   





(12) Peripheral edema


Code(s): R60.9 - EDEMA, UNSPECIFIED   Status: Chronic   





(13) Acute kidney injury


Code(s): N17.9 - ACUTE KIDNEY FAILURE, UNSPECIFIED   Status: Resolved   





(14) Thrombocytopenia


Code(s): D69.6 - THROMBOCYTOPENIA, UNSPECIFIED   Status: Resolved   





- Plan


Plan: 





77 yr old male who presented to hospital and found to be in sepsis





Retroperitoneal Hematoma


-s/p 1 unit PRBC


-hgb has fluctuated daily between 7.1 and 7.7 since the transfusion however 

this AM is decreased to 7.1 and he is noticeably dyspnic when oxygen weaned. 

May consider transfusion prior to DC. A


-repeat hgb prior to DC. 





acute blood loss anemia on macrocytic anemia


-b12 and folate wnl 


-see retroperitoneal hematoma  





Bilateral pulmonary embolism


-now s/p IVC filter placement


-anticoagulation contraindicated. 





YEHUDA- resolved, likely 2/2 to acute blood loss 


-FENA on 5/17 suggesting pre renal cause








chronic Hep B


-Hep B PCR quant elevated 


-Outpt followup 





malnourished


-supplements provided.


-poor albumin 





BPH


-chronic indwelling catheter for severe urinary retention with plan for HH to 

change catheter 1x/ month


-f/u Dr. Oliveira outpt 


-review of Dr. Oliveira's notes suggests a cystocopy has been done in last 4 

months showing 4 cm prostate wtih bilobar hypertrophy and bladder trabeculation 





hyperkalemia


-RESOLVED 





blindness





alcohol abuse


-cessation counseling





sepsis bacteremia 2/2 providencia rettgeri


-now improved


-Cipro day 7 of 14 





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 

indwelling catheter 2/2 severe urinary retention. 


-recommend outpatient follow up with urology to consider suprapubic catheter. 





gallbladder sludge


-s/p MRI abdomen with no acute findings


-no futher w/u 





transaminitis


-Resolved


-thought to be 2/2 chronic hepatitis B and alcoholism 





thrombocytopenia-RESOLVED








Possible DC today or tomorrow pending oxygen requirement.

## 2018-05-19 NOTE — EKG
Test Reason : 

Blood Pressure : ***/*** mmHG

Vent. Rate : 084 BPM     Atrial Rate : 084 BPM

   P-R Int : 196 ms          QRS Dur : 072 ms

    QT Int : 396 ms       P-R-T Axes : 081 078 085 degrees

   QTc Int : 467 ms

 

Normal sinus rhythm

Low voltage QRS

Septal infarct , age undetermined

Abnormal ECG

 

Confirmed by ELI POOLE, JUAN ([id number]),  FELICIA DOLL ([id number]) on 5/19/2018 4:56:55 PM

 

Referred By:             Confirmed By:JUAN BENITEZ MD

## 2018-05-20 LAB
ANION GAP SERPL CALC-SCNC: 5 MMOL/L (ref 10–20)
BUN SERPL-MCNC: 9 MG/DL (ref 8.4–25.7)
CALCIUM SERPL-MCNC: 8.1 MG/DL (ref 7.8–10.44)
CHLORIDE SERPL-SCNC: 103 MMOL/L (ref 98–107)
CO2 SERPL-SCNC: 34 MMOL/L (ref 23–31)
CREAT CL PREDICTED SERPL C-G-VRATE: 81 ML/MIN (ref 70–130)
GLUCOSE SERPL-MCNC: 99 MG/DL (ref 83–110)
HGB BLD-MCNC: 8.3 G/DL (ref 14–18)
PLATELET # BLD AUTO: 220 THOU/UL (ref 130–400)
POTASSIUM SERPL-SCNC: 3.9 MMOL/L (ref 3.5–5.1)
SODIUM SERPL-SCNC: 138 MMOL/L (ref 136–145)

## 2018-05-20 RX ADMIN — MOMETASONE FUROATE AND FORMOTEROL FUMARATE DIHYDRATE SCH PUFF: 200; 5 AEROSOL RESPIRATORY (INHALATION) at 10:26

## 2018-05-20 RX ADMIN — Medication SCH ML: at 21:13

## 2018-05-20 RX ADMIN — ALBUTEROL SULFATE PRN PUFF: 108 AEROSOL, METERED RESPIRATORY (INHALATION) at 19:37

## 2018-05-20 RX ADMIN — MULTIPLE VITAMINS W/ MINERALS TAB SCH TAB: TAB at 08:10

## 2018-05-20 RX ADMIN — ALBUTEROL SULFATE PRN PUFF: 108 AEROSOL, METERED RESPIRATORY (INHALATION) at 13:40

## 2018-05-20 RX ADMIN — MOMETASONE FUROATE AND FORMOTEROL FUMARATE DIHYDRATE SCH PUFF: 200; 5 AEROSOL RESPIRATORY (INHALATION) at 19:35

## 2018-05-20 RX ADMIN — ALBUTEROL SULFATE PRN PUFF: 108 AEROSOL, METERED RESPIRATORY (INHALATION) at 13:39

## 2018-05-20 RX ADMIN — Medication SCH ML: at 08:11

## 2018-05-20 RX ADMIN — ALBUTEROL SULFATE PRN PUFF: 108 AEROSOL, METERED RESPIRATORY (INHALATION) at 09:24

## 2018-05-20 RX ADMIN — ALBUTEROL SULFATE PRN PUFF: 108 AEROSOL, METERED RESPIRATORY (INHALATION) at 09:25

## 2018-05-20 NOTE — ADD-PRG
DATE OF SERVICE:  05/20/2018

 

ADDENDUM

 

The patient was seen and evaluated and examined with the residents by bedside.  Please see Dr. Shobha Cuellar's progress note for which I concur.  The main issue with this gentleman is when we can send him
 home.  We he gets upright, he states it seems to compress his breathing almost like his stomach carter
le bit distended and seem to get more short of breath and he was desatting slightly and getting a lit
tle bit more short of breath with that.  It does not sound like he is orthostatic or lightheaded when
 this is happening, just slow increase in shortness of breath, but otherwise it sounds like at home, 
he actually has pretty much lays around the couch all day.  Not interested in physical therapy and th
ings are fairly stable after the retroperitoneal hematoma.  I did review the CT and see no evidence o
f that hematomas anywhere near the IVC that would be compressing it.  He is not extremely edematous o
r anything like that, so I do not think this is a cardiac output issue on sitting upright.  I think t
his is likely just what affects the COPD.  So more likely, as long as we can get him on oxygen should
 be able to go home on oxygen and same medications including inhalers and diuretics as he does seem t
o be doing better on diuretics.  Recommend low dose aspirin therapy, although does have the IVC, whic
h should be beneficial as far as preventing DVT or progression of DVT.

## 2018-05-20 NOTE — PDOC.EVN
Event Note





- Event Note


Event Note: 





Went to check on patient since having him try a trial sitting up in bed. Upon 

entering room, patient appeared to be in obvious distress and O2 sat was 

reading at 72%. His oxygen had fallen down his face and nasal canula was on his 

chest. Upon questioning him, he was able to shake his head that he was not ok. 

His speech seemed garbled and he then motioned toward his mouth and mucous was 

obvious in back of his throat. I ran to nurses station to obtain suction and 

called a code green. As soon as suction was hooked up, a large amount of phlegm 

was suction from patient's throat. He immediately appeared to be in less 

distress and o2 sats were improved to 99% on 2 lts. He had another coughing 

spell while nurses and myself in room and was able to demonstrate using his 

suction and more phlegm removed. He did not have a strong cough and stated he 

felt weak coughing. Tried to sit patient in chair position in bed and he did 

not like this position and requested to be laid more flat. He is deemed unsafe 

to be up in chair due to fall risk and blindness. 


A stat CXR ordered. An urgent ECHO ordered to evaluate for change in 

pericardial effusion which was noted on ECHO on 5/14/18.

## 2018-05-20 NOTE — PDOC.FM
- Subjective


Subjective: 





Patient reports feeling well this morning. He is ready to go home. He reports 

baseline SOB at home. He reports being able to sit up on his own on his couch 

at home but decline sitting up to chair in hospital. Alcohol cessation 

counseling again discussed and patient states "I thought we said we are going 

to quit that."





Patient denies abdominal pain, body pain, chest pain. He only complains of some 

difficulty breathing. 





- Objective


Vital Signs & Weight: 


 Vital Signs (12 hours)











  Temp Pulse Resp BP Pulse Ox


 


 05/20/18 04:00  98.1 F  89  20  130/72  100


 


 05/20/18 00:00  98.8 F  94  20  136/72  97


 


 05/19/18 20:00  98.2 F  92  20  132/69  97


 


 05/19/18 19:39      95


 


 05/19/18 19:37   96  15   90 L








 Weight











Admit Weight                   56.812 kg


 


Weight                         67.268 kg














I&O: 


 











 05/19/18 05/20/18 05/21/18





 06:59 06:59 06:59


 


Intake Total 1267 1630 


 


Output Total 3600 1425 


 


Balance -2333 205 











Result Diagrams: 


 05/20/18 04:39





 05/20/18 04:39





Phys Exam





- Physical Examination


Constitutional: NAD


HEENT: moist MMs


Respiratory: no wheezing


decreased breath sounds bilateral lower lobes 


Cardiovascular: RRR, no significant murmur


Gastrointestinal: soft, non-tender, no distention, positive bowel sounds


2+ bilateral feet edema up to calf 


Neurological: non-focal


Psychiatric: normal affect, A&O x 3


Skin: cap refill <2 seconds





Dx/Plan


(1) Retroperitoneal hematoma


Code(s): K66.1 - HEMOPERITONEUM   Status: Acute   





(2) Acute blood loss anemia


Code(s): D62 - ACUTE POSTHEMORRHAGIC ANEMIA   Status: Acute   





(3) Pulmonary embolism during current hospitalization


Code(s): I26.99 - OTHER PULMONARY EMBOLISM WITHOUT ACUTE COR PULMONALE   Status

: Acute   





(4) Severe sepsis


Code(s): A41.9 - SEPSIS, UNSPECIFIED ORGANISM; R65.20 - SEVERE SEPSIS WITHOUT 

SEPTIC SHOCK   Status: Resolved   





(5) UTI (urinary tract infection) due to urinary indwelling catheter


Code(s): T83.511A - I/I REACT D/T INDWELLING URETHRAL CATHETER, INIT; N39.0 - 

URINARY TRACT INFECTION, SITE NOT SPECIFIED   Status: Suspected   





(6) Transaminitis


Code(s): R74.0 - NONSPEC ELEV OF LEVELS OF TRANSAMNS & LACTIC ACID DEHYDRGNSE   

Status: Acute   





(7) Macrocytic anemia


Code(s): D53.9 - NUTRITIONAL ANEMIA, UNSPECIFIED   Status: Chronic   





(8) Alcohol abuse


Code(s): F10.10 - ALCOHOL ABUSE, UNCOMPLICATED   Status: Chronic   





(9) BPH (benign prostatic hyperplasia)


Code(s): N40.0 - BENIGN PROSTATIC HYPERPLASIA WITHOUT LOWER URINRY TRACT SYMP   

Status: Chronic   





(10) HTN (hypertension)


Code(s): I10 - ESSENTIAL (PRIMARY) HYPERTENSION   Status: Chronic   





(11) Malnourished


Code(s): E46 - UNSPECIFIED PROTEIN-CALORIE MALNUTRITION   Status: Chronic   





(12) Peripheral edema


Code(s): R60.9 - EDEMA, UNSPECIFIED   Status: Chronic   





(13) Acute kidney injury


Code(s): N17.9 - ACUTE KIDNEY FAILURE, UNSPECIFIED   Status: Resolved   





(14) Thrombocytopenia


Code(s): D69.6 - THROMBOCYTOPENIA, UNSPECIFIED   Status: Resolved   





(15) COPD (chronic obstructive pulmonary disease)


Status: Chronic   





(16) Chronic respiratory failure with hypoxia


Code(s): J96.11 - CHRONIC RESPIRATORY FAILURE WITH HYPOXIA   Status: Acute   





- Plan


Plan: 





77 yr old male who presented to hospital and found to be in sepsis





Retroperitoneal Hematoma


-s/p 2 unit PRBC (5/16 & 5/20)


-hgb improved from 7.1 to 8.9 after 1 unit PRBC yesterday which seems a little 

falsely elevated, however is at 8.3 today. 


-cont Ferrous sulfate supplement BID. 


-Follow up outpatient for repeat H/H in 1 week. 





chronic hypoxic respiratory failure 


-likely 2/2 COPD with acute pulmonary embolism


-will arrange home O2





acute blood loss anemia on macrocytic anemia


-b12 and folate wnl 


-see retroperitoneal hematoma  


-BID ferrous sulfate 





Bilateral pulmonary embolism


-now s/p IVC filter placement


-anticoagulation contraindicated. 





COPD


-cont symbicort BID


-cont albuterol PRN


-prefer to keep O2 sat at 92-94%


-recommend up in chair daily but patient has declined this. 





YEHUDA- resolved, likely 2/2 to acute blood loss 


-FENA on 5/17 suggesting pre renal cause





chronic Hep B


-Hep B PCR quant elevated 


-Outpt followup with Dr. Beatriz





malnourished


-TID ensure- have encouraged him to continue this at home. 


-poor albumin 





BPH


-chronic indwelling catheter for severe urinary retention with plan for HH to 

change catheter 1x/ month


-f/u Dr. Oliveira outpt 


-review of Dr. Oliveira's notes suggests a cystocopy has been done in last 4 

months showing 4 cm prostate wtih bilobar hypertrophy and bladder trabeculation 








blindness





alcohol abuse


-cessation counseling provided and patient agreeable. 





sepsis bacteremia 2/2 providencia rettgeri


-sepsis resolved


-Cipro day 9 of 14 





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 

indwelling catheter 2/2 severe urinary retention. 


-recommend outpatient follow up with urology to consider suprapubic catheter. 





gallbladder sludge


-s/p MRI abdomen with no acute findings


-no futher w/u 





transaminitis


-Resolved


-thought to be 2/2 chronic hepatitis B and alcoholism 





thrombocytopenia-RESOLVED





hyperkalemia


-RESOLVED 








Plan for DC later today. Discussed discharge plan with patient's wife at 

length. Will require home O2 since patient is hypoxic with exertion dropping to 

83% on RA. Suspect this is exacerbated by recent pulmonary embolism.  Patient 

has home health for chronic indwelling catheter.

## 2018-05-20 NOTE — RAD
PORTABLE CHEST:

 

Date:  05/20/18 

 

HISTORY:  

Hypoxia. Dyspnea. 

 

COMPARISON:  

05/13/18. 

 

FINDINGS:

Heart size is within normal limits with atherosclerotic changes of aorta. Lungs appear hyperexpanded.
 Some increased density in the lung bases suggesting small effusions. 

 

IMPRESSION: 

1.  COPD type changes. 

2.  Small bilateral pleural effusions, left larger than right. 

 

 

POS: [facility]

## 2018-05-20 NOTE — ADD-PRG
ADDENDUM

 

Patient was seen and evaluated and examined and discussed with residents, please see note from Dr. Bong duncan, which I concur.  Basically, gentleman is stable from recent hematoma.  Blood count dropped a lit
tle bit despite diuresis and he is a little bit fluid overloaded, so the thought would be to give him
 a little extra unit of blood today, but his chest has decreased breath sounds from COPD, but definit
ely no crackles.

 

CARDIOVASCULAR:  Regular rate and rhythm.

EXTREMITIES:  Show trace to 1+ pitting.  It sounds like his baseline.

 

Other than the hemoglobin being slightly low, everything else seems pretty stable.  We will give him 
a unit of blood and follow his oxygen levels today, is a little bit low.  We may have to get him set 
up for home oxygen.  It sounds like he has got caregivers at home and not much difference will be don
e at home, then is really having in the hospital right now as he is very stable otherwise, so we will
 see how he does with today and may end up having to discharge him tomorrow depending on how quickly 
we can get the blood in, etc.

## 2018-05-21 LAB
ANION GAP SERPL CALC-SCNC: 7 MMOL/L (ref 10–20)
BUN SERPL-MCNC: 6 MG/DL (ref 8.4–25.7)
CALCIUM SERPL-MCNC: 7.9 MG/DL (ref 7.8–10.44)
CHLORIDE SERPL-SCNC: 100 MMOL/L (ref 98–107)
CO2 SERPL-SCNC: 33 MMOL/L (ref 23–31)
CREAT CL PREDICTED SERPL C-G-VRATE: 83 ML/MIN (ref 70–130)
GLUCOSE SERPL-MCNC: 95 MG/DL (ref 83–110)
HGB BLD-MCNC: 8.5 G/DL (ref 14–18)
PLATELET # BLD AUTO: 206 THOU/UL (ref 130–400)
POTASSIUM SERPL-SCNC: 3.7 MMOL/L (ref 3.5–5.1)
SODIUM SERPL-SCNC: 136 MMOL/L (ref 136–145)

## 2018-05-21 RX ADMIN — ALBUTEROL SULFATE PRN PUFF: 108 AEROSOL, METERED RESPIRATORY (INHALATION) at 13:46

## 2018-05-21 RX ADMIN — Medication SCH ML: at 20:10

## 2018-05-21 RX ADMIN — MOMETASONE FUROATE AND FORMOTEROL FUMARATE DIHYDRATE SCH PUFF: 200; 5 AEROSOL RESPIRATORY (INHALATION) at 19:07

## 2018-05-21 RX ADMIN — MULTIPLE VITAMINS W/ MINERALS TAB SCH TAB: TAB at 08:20

## 2018-05-21 RX ADMIN — ALBUTEROL SULFATE PRN PUFF: 108 AEROSOL, METERED RESPIRATORY (INHALATION) at 19:09

## 2018-05-21 RX ADMIN — ALBUTEROL SULFATE PRN PUFF: 108 AEROSOL, METERED RESPIRATORY (INHALATION) at 08:55

## 2018-05-21 RX ADMIN — ALBUTEROL SULFATE PRN PUFF: 108 AEROSOL, METERED RESPIRATORY (INHALATION) at 08:57

## 2018-05-21 RX ADMIN — Medication SCH ML: at 08:20

## 2018-05-21 RX ADMIN — MOMETASONE FUROATE AND FORMOTEROL FUMARATE DIHYDRATE SCH PUFF: 200; 5 AEROSOL RESPIRATORY (INHALATION) at 08:55

## 2018-05-21 NOTE — EKG
Test Reason : 

Blood Pressure : ***/*** mmHG

Vent. Rate : 109 BPM     Atrial Rate : 109 BPM

   P-R Int : 156 ms          QRS Dur : 070 ms

    QT Int : 370 ms       P-R-T Axes : 079 067 049 degrees

   QTc Int : 498 ms

 

Sinus tachycardia

Low voltage QRS

Nonspecific T wave abnormality

Abnormal ECG

When compared with ECG of 08-MAY-2018 20:11, (Unconfirmed)

No significant change was found

Confirmed by DR. JERRICA IRVING ([id number]) on 5/21/2018 1:20:24 PM

 

Referred By:  AMADOR           Confirmed By:DR. JERRICA IRVING

## 2018-05-21 NOTE — PDOC.FM
- Subjective


Subjective: 





Patient lying flat in bed this morning with no complaints. He was tachycardic 

overnight but asymptomatic. Rate of 100-120 bpm. No issues sleeping, eating, or 

using the bathroom. 





- Objective


MAR Reviewed: Yes


Vital Signs & Weight: 


 Vital Signs (12 hours)











  Temp Pulse Resp BP BP Pulse Ox


 


 05/21/18 08:55   115 H  16   


 


 05/21/18 07:24  98.2 F  112 H  16   145/86 H  100


 


 05/21/18 03:56  98.7 F  111 H  18  137/76   99


 


 05/21/18 00:00  98.2 F  83  20   130/66  98








 Weight











Admit Weight                   56.812 kg


 


Weight                         64.274 kg














I&O: 


 











 05/20/18 05/21/18 05/22/18





 06:59 06:59 06:59


 


Intake Total 1630 840 


 


Output Total 1425 2350 


 


Balance 205 -1510 











Result Diagrams: 


 05/21/18 04:30





 05/21/18 04:30





<Yariel Chatman C - Last Filed: 05/21/18 10:25>





- Objective


Vital Signs & Weight: 


 Vital Signs (12 hours)











  Temp Pulse Resp BP BP Pulse Ox


 


 05/21/18 11:24  98.6 F  108 H  18   122/72  100


 


 05/21/18 08:55   115 H  16   


 


 05/21/18 07:24  98.2 F  112 H  16   145/86 H  100


 


 05/21/18 03:56  98.7 F  111 H  18  137/76   99








 Weight











Admit Weight                   56.812 kg


 


Weight                         64.274 kg














I&O: 


 











 05/20/18 05/21/18 05/22/18





 06:59 06:59 06:59


 


Intake Total 1630 840 


 


Output Total 1425 2350 


 


Balance 205 -1510 











Result Diagrams: 


 05/21/18 04:30





 05/21/18 04:30





<Hussain Stephens - Last Filed: 05/21/18 12:15>





Phys Exam





- Physical Examination


HEENT: moist MMs


Neck: no nodes, no JVD


Respiratory: no wheezing


decreased breath sounds bilaterally


Cardiovascular: RRR, no significant murmur


Gastrointestinal: soft, non-tender


1+ pitting edema to mid shin bilaterally


Neurological: moves all 4 limbs


Psychiatric: normal affect, A&O x 3





<Yariel Chatman - Last Filed: 05/21/18 10:25>





Dx/Plan


(1) Retroperitoneal hematoma


Code(s): K66.1 - HEMOPERITONEUM   Status: Acute   


Plan: 


-2/2 IVC filter placement


-s/p 2u PRBC, with stable Hg this AM


-will need close f/u outpatient








(2) Acute blood loss anemia


Code(s): D62 - ACUTE POSTHEMORRHAGIC ANEMIA   Status: Acute   


Plan: 


-MCV elevated but B12 and folate normal indicating a multifactorial etiology


-likely 2/2 retroperitoneal bleed from IVC filter placement


-Hg has stabilized, no s/s of further blood loss








(3) Chronic respiratory failure with hypoxia


Code(s): J96.11 - CHRONIC RESPIRATORY FAILURE WITH HYPOXIA   Status: Acute   


Plan: 


-patient has baseline COPD complicated by PE while in hospital


-he likely has a history of numerous emboli given his history, risk facotrs, 

and need for oxygen


-he will be sitting up in bed today so that we can monitor his respiratory 

reserve


-home oxygen upon d/c 








(4) Pulmonary embolism during current hospitalization


Code(s): I26.99 - OTHER PULMONARY EMBOLISM WITHOUT ACUTE COR PULMONALE   Status

: Resolved   


Plan: 


-IVC filter placed


-chemical anticoagulation contraindicated due to visual impairment and high 

fall risk








(5) COPD (chronic obstructive pulmonary disease)


Status: Chronic   


Plan: 


-continue symbicort and albuterol


-O2 to remain between 90-94%








(6) Alcohol abuse


Code(s): F10.10 - ALCOHOL ABUSE, UNCOMPLICATED   Status: Chronic   


Plan: 


-no withdraw sxs while in hospital


-couselled extensively regarding cessation








(7) BPH (benign prostatic hyperplasia)


Code(s): N40.0 - BENIGN PROSTATIC HYPERPLASIA WITHOUT LOWER URINRY TRACT SYMP   

Status: Chronic   


Plan: 


-chronic indwelling plaza catheter managed in OP setting by Dr. Oliveira, who 

has done recent cystoscopy


-will be changed by HH monthly








(8) Macrocytic anemia


Code(s): D53.9 - NUTRITIONAL ANEMIA, UNSPECIFIED   Status: Chronic   


Plan: 


see above








(9) Malnourished


Code(s): E46 - UNSPECIFIED PROTEIN-CALORIE MALNUTRITION   Status: Chronic   


Plan: 


-encouraged increased PO intake


-continue ensure TID supplementation








(10) UTI (urinary tract infection) due to urinary indwelling catheter


Code(s): T83.511A - I/I REACT D/T INDWELLING URETHRAL CATHETER, INIT; N39.0 - 

URINARY TRACT INFECTION, SITE NOT SPECIFIED   Status: Suspected   


Plan: 


-sepsis resolved, continue Cipro (day 10 of 14)


-chronically colonized 2/2 indwelling plaza catheter








(11) Blindness


Code(s): H54.7 - UNSPECIFIED VISUAL LOSS   Status: Chronic   





- Plan


Plan: 





-bolus with 500cc of NS and monitor response; bolus additional 500cc if 

tachycardia not improved


-monitor O2 saturation while patient sits up in bed today


-set up home oxygen 





<Yariel Chatman - Last Filed: 05/21/18 10:25>





Attending Addendum





- Attending Addendum


Date/Time: 05/21/18 1213





I personally evaluated the patient and discussed the management with Dr. Chatman.


I agree with the History, Examination, Assessment and Plan documented above 

with any addition or exceptions noted below.





Patient improved today. His Hgb counts are stable and so unlikely that he is 

continuing to bleed into his hematoma. He continues on supplemental O2 therapy, 

and we are waiting for outpatient O2 therapy approval. He had code Green 

yesterday due to difficulty with expectorating mucous. We will start Mucinex 

today and ensure he does not have issues with this again. If he does well and 

can sit up without desaturations, consider discharge home tomorrow. 





<Hussain Stephens - Last Filed: 05/21/18 12:15>

## 2018-05-21 NOTE — EKG
Test Reason : 

Blood Pressure : ***/*** mmHG

Vent. Rate : 082 BPM     Atrial Rate : 082 BPM

   P-R Int : 158 ms          QRS Dur : 072 ms

    QT Int : 400 ms       P-R-T Axes : 062 071 043 degrees

   QTc Int : 467 ms

 

Sinus rhythm with Premature atrial complexes

Low voltage QRS

T wave abnormality, consider anterior ischemia

Abnormal ECG

When compared with ECG of 13-MAY-2018 14:11, (Unconfirmed)

Premature atrial complexes are now Present

Confirmed by DR. JERRICA IRVING ([id number]) on 5/21/2018 1:21:40 PM

 

Referred By:  AMADOR           Confirmed By:DR. JERRICA IRVING

## 2018-05-22 VITALS — TEMPERATURE: 98.9 F | DIASTOLIC BLOOD PRESSURE: 79 MMHG | SYSTOLIC BLOOD PRESSURE: 152 MMHG

## 2018-05-22 LAB
ANION GAP SERPL CALC-SCNC: 5 MMOL/L (ref 10–20)
BUN SERPL-MCNC: 5 MG/DL (ref 8.4–25.7)
CALCIUM SERPL-MCNC: 7.8 MG/DL (ref 7.8–10.44)
CHLORIDE SERPL-SCNC: 100 MMOL/L (ref 98–107)
CO2 SERPL-SCNC: 36 MMOL/L (ref 23–31)
CREAT CL PREDICTED SERPL C-G-VRATE: 87 ML/MIN (ref 70–130)
GLUCOSE SERPL-MCNC: 115 MG/DL (ref 83–110)
HGB BLD-MCNC: 9.1 G/DL (ref 14–18)
PLATELET # BLD AUTO: 205 THOU/UL (ref 130–400)
POTASSIUM SERPL-SCNC: 3.6 MMOL/L (ref 3.5–5.1)
SODIUM SERPL-SCNC: 137 MMOL/L (ref 136–145)

## 2018-05-22 RX ADMIN — MOMETASONE FUROATE AND FORMOTEROL FUMARATE DIHYDRATE SCH PUFF: 200; 5 AEROSOL RESPIRATORY (INHALATION) at 07:40

## 2018-05-22 RX ADMIN — MULTIPLE VITAMINS W/ MINERALS TAB SCH TAB: TAB at 08:56

## 2018-05-22 RX ADMIN — ALBUTEROL SULFATE PRN PUFF: 108 AEROSOL, METERED RESPIRATORY (INHALATION) at 14:52

## 2018-05-22 RX ADMIN — Medication SCH ML: at 08:57

## 2018-05-22 RX ADMIN — ALBUTEROL SULFATE PRN PUFF: 108 AEROSOL, METERED RESPIRATORY (INHALATION) at 07:38

## 2018-05-22 NOTE — PDOC.FM
- Subjective


Subjective: 





No acute events overnight. Vital signs stable. No issues per nursing. Patient 

sat up in bed this morning and maintained oxygen saturation.





- Objective


MAR Reviewed: Yes


Vital Signs & Weight: 


 Vital Signs (12 hours)











  Temp Pulse Resp BP Pulse Ox


 


 05/22/18 08:00  98.9 F  89  20   100


 


 05/22/18 07:39  98.9 F  89  20  137/75  100


 


 05/22/18 07:38   91  16   100


 


 05/22/18 03:38  98.8 F  83  20  137/78  100








 Weight











Admit Weight                   56.812 kg


 


Weight                         62.641 kg














I&O: 


 











 05/21/18 05/22/18 05/23/18





 06:59 06:59 06:59


 


Intake Total 840 1440 


 


Output Total 2350 1025 


 


Balance -1510 415 











Result Diagrams: 


 05/22/18 04:30





 05/22/18 04:30





<Yariel Chatman - Last Filed: 05/22/18 10:51>





- Objective


Vital Signs & Weight: 


 Vital Signs (12 hours)











  Temp Pulse Resp BP Pulse Ox


 


 05/22/18 08:00  98.9 F  89  20   100


 


 05/22/18 07:39  98.9 F  89  20  137/75  100


 


 05/22/18 07:38   91  16   100


 


 05/22/18 03:38  98.8 F  83  20  137/78  100








 Weight











Admit Weight                   56.812 kg


 


Weight                         62.641 kg














I&O: 


 











 05/21/18 05/22/18 05/23/18





 06:59 06:59 06:59


 


Intake Total 840 1440 


 


Output Total 2350 1025 


 


Balance -1510 415 











Result Diagrams: 


 05/22/18 04:30





 05/22/18 04:30





<Hussain Stephens - Last Filed: 05/22/18 11:11>





Phys Exam





- Physical Examination


Constitutional: NAD


HEENT: moist MMs


Respiratory: no wheezing, no rales


Cardiovascular: RRR, no significant murmur


Gastrointestinal: soft, non-tender, no distention


Neurological: moves all 4 limbs


Psychiatric: normal affect, A&O x 3





<Yariel Chatman - Last Filed: 05/22/18 10:51>





Dx/Plan


(1) Retroperitoneal hematoma


Code(s): K66.1 - HEMOPERITONEUM   Status: Acute   


Plan: 


-2/2 IVC filter placement


-s/p 2u PRBC, with stable Hg this AM (9.1)


-will need close f/u outpatient








(2) Acute blood loss anemia


Code(s): D62 - ACUTE POSTHEMORRHAGIC ANEMIA   Status: Acute   


Plan: 


-MCV elevated but B12 and folate normal indicating a multifactorial etiology


-likely 2/2 retroperitoneal bleed from IVC filter placement


-Hg has stabilized, no s/s of further blood loss








(3) Chronic respiratory failure with hypoxia


Code(s): J96.11 - CHRONIC RESPIRATORY FAILURE WITH HYPOXIA   Status: Acute   


Plan: 


-patient has baseline COPD complicated by PE while in hospital


-he likely has a history of numerous emboli given his history, risk factors, 

and need for oxygen


-he sat up in bed today and maintained his O2 sats


-home oxygen upon d/c 








(4) Pulmonary embolism during current hospitalization


Code(s): I26.99 - OTHER PULMONARY EMBOLISM WITHOUT ACUTE COR PULMONALE   Status

: Resolved   


Plan: 


-IVC filter placed


-chemical anticoagulation contraindicated due to visual impairment and high 

fall risk








(5) COPD (chronic obstructive pulmonary disease)


Status: Chronic   


Plan: 


-continue symbicort and albuterol


-O2 to remain between 90-94%








(6) Alcohol abuse


Code(s): F10.10 - ALCOHOL ABUSE, UNCOMPLICATED   Status: Chronic   


Plan: 


-no withdraw sxs while in hospital


-couselled extensively regarding cessation








(7) BPH (benign prostatic hyperplasia)


Code(s): N40.0 - BENIGN PROSTATIC HYPERPLASIA WITHOUT LOWER URINRY TRACT SYMP   

Status: Chronic   


Plan: 


-chronic indwelling plaza catheter managed in OP setting by Dr. Oliveira, who 

has done recent cystoscopy


-will be changed by HH monthly








(8) Macrocytic anemia


Code(s): D53.9 - NUTRITIONAL ANEMIA, UNSPECIFIED   Status: Chronic   


Plan: 


see above








(9) Malnourished


Code(s): E46 - UNSPECIFIED PROTEIN-CALORIE MALNUTRITION   Status: Chronic   


Plan: 


-encouraged increased PO intake


-continue ensure TID supplementation








(10) UTI (urinary tract infection) due to urinary indwelling catheter


Code(s): T83.511A - I/I REACT D/T INDWELLING URETHRAL CATHETER, INIT; N39.0 - 

URINARY TRACT INFECTION, SITE NOT SPECIFIED   Status: Suspected   


Plan: 


-sepsis resolved, continue Cipro (day 10 of 14)


-chronically colonized 2/2 indwelling plaza catheter








(11) Blindness


Code(s): H54.7 - UNSPECIFIED VISUAL LOSS   Status: Chronic   





- Plan


Plan: 





-discharge today with oxygen





<Yariel Chatman - Last Filed: 05/22/18 10:51>





Attending Addendum





- Attending Addendum


Date/Time: 05/22/18 1110





I personally evaluated the patient and discussed the management with Dr. Chatman.


I agree with the History, Examination, Assessment and Plan documented above 

with any addition or exceptions noted below.





Patient doing well this morning. Blood counts stable. He is not hypoxic on 

rising from supine position once he takes deep breaths. Once his home O2 is 

approved (will likely be a chronic need 2/2 clot burden in his lungs), he 

should be stable for discharge home. He will need follow up with PCP in the 

next 2-3 days. 








<Hussain Stephens - Last Filed: 05/22/18 11:11>

## 2018-05-27 ENCOUNTER — HOSPITAL ENCOUNTER (INPATIENT)
Dept: HOSPITAL 92 - ERS | Age: 78
LOS: 4 days | DRG: 308 | End: 2018-05-31
Attending: FAMILY MEDICINE | Admitting: FAMILY MEDICINE
Payer: MEDICARE

## 2018-05-27 VITALS — BODY MASS INDEX: 22.8 KG/M2

## 2018-05-27 DIAGNOSIS — R64: ICD-10-CM

## 2018-05-27 DIAGNOSIS — I10: ICD-10-CM

## 2018-05-27 DIAGNOSIS — Z86.711: ICD-10-CM

## 2018-05-27 DIAGNOSIS — Z86.19: ICD-10-CM

## 2018-05-27 DIAGNOSIS — E86.0: ICD-10-CM

## 2018-05-27 DIAGNOSIS — J96.22: ICD-10-CM

## 2018-05-27 DIAGNOSIS — K76.9: ICD-10-CM

## 2018-05-27 DIAGNOSIS — Z87.19: ICD-10-CM

## 2018-05-27 DIAGNOSIS — Z95.828: ICD-10-CM

## 2018-05-27 DIAGNOSIS — Z66: ICD-10-CM

## 2018-05-27 DIAGNOSIS — E88.09: ICD-10-CM
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
## 2018-05-27 DIAGNOSIS — Z78.1: ICD-10-CM

## 2018-05-27 DIAGNOSIS — I48.91: Primary | ICD-10-CM

## 2018-05-27 DIAGNOSIS — E46: ICD-10-CM

## 2018-05-27 DIAGNOSIS — D53.9: ICD-10-CM

## 2018-05-27 DIAGNOSIS — F10.10: ICD-10-CM

## 2018-05-27 DIAGNOSIS — Z79.51: ICD-10-CM

## 2018-05-27 DIAGNOSIS — N40.0: ICD-10-CM

## 2018-05-27 DIAGNOSIS — Z79.899: ICD-10-CM

## 2018-05-27 DIAGNOSIS — T17.890A: ICD-10-CM

## 2018-05-27 DIAGNOSIS — B18.1: ICD-10-CM

## 2018-05-27 DIAGNOSIS — H54.7: ICD-10-CM

## 2018-05-27 DIAGNOSIS — Z87.440: ICD-10-CM

## 2018-05-27 DIAGNOSIS — Z79.82: ICD-10-CM

## 2018-05-27 DIAGNOSIS — Z51.5: ICD-10-CM

## 2018-05-27 DIAGNOSIS — E86.1: ICD-10-CM

## 2018-05-27 DIAGNOSIS — N47.8: ICD-10-CM

## 2018-05-27 DIAGNOSIS — M62.59: ICD-10-CM

## 2018-05-27 DIAGNOSIS — J44.1: ICD-10-CM

## 2018-05-27 DIAGNOSIS — Z99.81: ICD-10-CM

## 2018-05-27 DIAGNOSIS — Z87.891: ICD-10-CM

## 2018-05-27 DIAGNOSIS — I16.0: ICD-10-CM

## 2018-05-27 DIAGNOSIS — F12.10: ICD-10-CM

## 2018-05-27 LAB
ALBUMIN SERPL BCG-MCNC: 2.9 G/DL (ref 3.4–4.8)
ALP SERPL-CCNC: 61 U/L (ref 40–150)
ALT SERPL W P-5'-P-CCNC: 12 U/L (ref 8–55)
ANION GAP SERPL CALC-SCNC: 13 MMOL/L (ref 10–20)
AST SERPL-CCNC: 29 U/L (ref 5–34)
BASOPHILS # BLD AUTO: 0.1 THOU/UL (ref 0–0.2)
BASOPHILS NFR BLD AUTO: 0.5 % (ref 0–1)
BILIRUB SERPL-MCNC: 0.9 MG/DL (ref 0.2–1.2)
BUN SERPL-MCNC: 9 MG/DL (ref 8.4–25.7)
CALCIUM SERPL-MCNC: 9.2 MG/DL (ref 7.8–10.44)
CHLORIDE SERPL-SCNC: 92 MMOL/L (ref 98–107)
CK MB SERPL-MCNC: 3.1 NG/ML (ref 0–6.6)
CO2 SERPL-SCNC: 39 MMOL/L (ref 23–31)
CREAT CL PREDICTED SERPL C-G-VRATE: 0 ML/MIN (ref 70–130)
CRYSTAL-AUWI FLAG: 0.5 (ref 0–15)
EOSINOPHIL # BLD AUTO: 0.2 THOU/UL (ref 0–0.7)
EOSINOPHIL NFR BLD AUTO: 1.4 % (ref 0–10)
GLOBULIN SER CALC-MCNC: 3.8 G/DL (ref 2.4–3.5)
GLUCOSE SERPL-MCNC: 130 MG/DL (ref 83–110)
HEV IGM SER QL: 7.7 (ref 0–7.99)
HGB BLD-MCNC: 11.4 G/DL (ref 14–18)
HYALINE CASTS #/AREA URNS LPF: (no result) LPF
LYMPHOCYTES # BLD: 2.2 THOU/UL (ref 1.2–3.4)
LYMPHOCYTES NFR BLD AUTO: 15.5 % (ref 21–51)
MAGNESIUM SERPL-MCNC: 1.5 MG/DL (ref 1.6–2.6)
MCH RBC QN AUTO: 34.2 PG (ref 27–31)
MCV RBC AUTO: 108 FL (ref 80–94)
MONOCYTES # BLD AUTO: 1.7 THOU/UL (ref 0.11–0.59)
MONOCYTES NFR BLD AUTO: 11.5 % (ref 0–10)
NEUTROPHILS # BLD AUTO: 10.3 THOU/UL (ref 1.4–6.5)
NEUTROPHILS NFR BLD AUTO: 71.1 % (ref 42–75)
PATHC CAST-AUWI FLAG: 0.58 (ref 0–2.49)
PLATELET # BLD AUTO: 327 THOU/UL (ref 130–400)
POTASSIUM SERPL-SCNC: 3.7 MMOL/L (ref 3.5–5.1)
PROT UR STRIP.AUTO-MCNC: 30 MG/DL
RBC # BLD AUTO: 3.33 MILL/UL (ref 4.7–6.1)
RBC UR QL AUTO: (no result) HPF (ref 0–3)
SODIUM SERPL-SCNC: 140 MMOL/L (ref 136–145)
SP GR UR STRIP: 1.02 (ref 1–1.04)
SPERM-AUWI FLAG: 0 (ref 0–9.9)
TROPONIN I SERPL DL<=0.01 NG/ML-MCNC: (no result) NG/ML (ref ?–0.03)
TROPONIN I SERPL DL<=0.01 NG/ML-MCNC: (no result) NG/ML (ref ?–0.03)
TROPONIN I SERPL DL<=0.01 NG/ML-MCNC: 0.01 NG/ML (ref ?–0.03)
WBC # BLD AUTO: 14.4 THOU/UL (ref 4.8–10.8)
WBC UR QL AUTO: (no result) HPF (ref 0–3)
YEAST-AUWI FLAG: 0 (ref 0–25)

## 2018-05-27 PROCEDURE — 96365 THER/PROPH/DIAG IV INF INIT: CPT

## 2018-05-27 PROCEDURE — 81001 URINALYSIS AUTO W/SCOPE: CPT

## 2018-05-27 PROCEDURE — 93005 ELECTROCARDIOGRAM TRACING: CPT

## 2018-05-27 PROCEDURE — 84145 PROCALCITONIN (PCT): CPT

## 2018-05-27 PROCEDURE — 83735 ASSAY OF MAGNESIUM: CPT

## 2018-05-27 PROCEDURE — 93306 TTE W/DOPPLER COMPLETE: CPT

## 2018-05-27 PROCEDURE — 94640 AIRWAY INHALATION TREATMENT: CPT

## 2018-05-27 PROCEDURE — 84443 ASSAY THYROID STIM HORMONE: CPT

## 2018-05-27 PROCEDURE — 80048 BASIC METABOLIC PNL TOTAL CA: CPT

## 2018-05-27 PROCEDURE — 93010 ELECTROCARDIOGRAM REPORT: CPT

## 2018-05-27 PROCEDURE — 82805 BLOOD GASES W/O2 SATURATION: CPT

## 2018-05-27 PROCEDURE — 84484 ASSAY OF TROPONIN QUANT: CPT

## 2018-05-27 PROCEDURE — P9047 ALBUMIN (HUMAN), 25%, 50ML: HCPCS

## 2018-05-27 PROCEDURE — 83880 ASSAY OF NATRIURETIC PEPTIDE: CPT

## 2018-05-27 PROCEDURE — 96376 TX/PRO/DX INJ SAME DRUG ADON: CPT

## 2018-05-27 PROCEDURE — 87086 URINE CULTURE/COLONY COUNT: CPT

## 2018-05-27 PROCEDURE — 82040 ASSAY OF SERUM ALBUMIN: CPT

## 2018-05-27 PROCEDURE — 83605 ASSAY OF LACTIC ACID: CPT

## 2018-05-27 PROCEDURE — 94760 N-INVAS EAR/PLS OXIMETRY 1: CPT

## 2018-05-27 PROCEDURE — 80053 COMPREHEN METABOLIC PANEL: CPT

## 2018-05-27 PROCEDURE — 71045 X-RAY EXAM CHEST 1 VIEW: CPT

## 2018-05-27 PROCEDURE — 36415 COLL VENOUS BLD VENIPUNCTURE: CPT

## 2018-05-27 PROCEDURE — 94003 VENT MGMT INPAT SUBQ DAY: CPT

## 2018-05-27 PROCEDURE — 85025 COMPLETE CBC W/AUTO DIFF WBC: CPT

## 2018-05-27 PROCEDURE — 94002 VENT MGMT INPAT INIT DAY: CPT

## 2018-05-27 PROCEDURE — S0028 INJECTION, FAMOTIDINE, 20 MG: HCPCS

## 2018-05-27 PROCEDURE — 82553 CREATINE MB FRACTION: CPT

## 2018-05-27 PROCEDURE — 84100 ASSAY OF PHOSPHORUS: CPT

## 2018-05-27 RX ADMIN — Medication SCH: at 11:10

## 2018-05-27 RX ADMIN — Medication SCH: at 20:14

## 2018-05-27 NOTE — PDOC.FPRHP
- History of Present Illness


Chief Complaint: SOB


History of Present Illness: 





76 yo M presents to the ED with complain of gradual onset SOB over the past 2 

days. He has a hx of COPD, HTN, chronic hep B, etoh abuse and a recent 

hospitalization for severe sepsis that was complicated by a PE and IVC filter 

placement. Since his hospitalization he has taken very little PO stating that 

he just hasnt felt hungry or thirsty. He states that he felt that he wasnt 

getting enough oxygen at home so he asked to come the emergency room. He denies 

other symptoms such as chest pain, cough, fever, abd pain, or n/v. 





In the ED he was noted to be in new onset afib w/RVR with rate as high as132. 

Additionally, pt had BP as high as 177/117. He was started on a dilt drip in 

the ED with improvement of HR to 100-110





- Allergies/Adverse Reactions


 Allergies











Allergy/AdvReac Type Severity Reaction Status Date / Time


 


No Known Drug Allergies Allergy   Verified 05/08/18 23:48














- Home Medications


 











 Medication  Instructions  Recorded  Confirmed  Type


 


Finasteride [Proscar] 5 mg PO DAILY 05/08/18 05/27/18 History


 


Nebivolol HCl [Bystolic] 10 mg PO DAILY 05/08/18 05/27/18 History


 


Triamterene/Hydrochlorothiazid 1 cap PO DAILY 05/08/18 05/27/18 History





[Triamterene-Hctz 37.5-25 mg Cp]    


 


amLODIPine Bes/Olmesartan Med 1 each PO DAILY 05/08/18 05/27/18 History





[Pipo 5-20 mg Tablet]    


 


Multivitamin W/ Minerals 1 tab PO DAILY  tab 05/13/18 05/27/18 Rx





[Theragran M]    


 


Aspirin 325 mg PO DAILY #30 tab 05/20/18 05/27/18 Rx


 


Budesonide-Formoterol [Symbicort 1 puff INH BID #1 aer 05/20/18 05/27/18 Rx





160-4.5]    


 


Ferrous Sulfate [Feosol] 325 mg PO BID-WM #60 tab 05/20/18 05/27/18 Rx


 


Folic Acid [Folvite] 1 mg PO DAILY #30 tab 05/20/18 05/27/18 Rx


 


Furosemide 20 mg PO DAILY PRN #30 tablet 05/20/18 05/27/18 Rx


 


Thiamine 100 mg PO DAILY #30 tab 05/20/18 05/27/18 Rx


 


Budesonide-Formoterol [Symbicort  05/27/18  History





160-4.5]    














- History


PMHx:


COPD


HTN


Severe BPH


Chronic Hep B


Calorie malnutrition


hx of PE


hx of Retroperitoneal hematoma


blindness


 


PSHx: 


s/p IVC filter





FHx:


unk


 


Social:


45 pk yr hx of smoking 


2 5ths of whisky weekly for years 


Marijuana use


 








- Review of Systems


General: denies: fever/chills, weight/appetite/sleep changes


Eyes: denies: vision changes


ENT: denies: nasal congestion


Respiratory: reports: shortness of breath.  denies: cough, congestion


Cardiovascular: reports: edema (Chronic in LE).  denies: chest pain, palpitation


Gastrointestinal: reports: diarrhea (x1 3 days ago).  denies: nausea, vomiting


Genitourinary: reports: other (plaza in place)


Skin: denies: rashes, lesions


Musculoskeletal: denies: pain, tenderness


Neurological: denies: numbness, syncope, seizure


Psychological: denies: anxiety, depression





- Vital signs


BP: 177/117  HR: 132 RR: 28 Tmax: 91.7 Pox: 99% on 4L  Wt: 61 kg   








- Physical Exam


Constitutional: NAD, awake, alert and oriented


HEENT: normocephalic and atraumatic, grossly normal hearing, other (blind b/l)


Neck: trachea midline


Chest: no-tender to palpation


Heart: RRR, normal S1/S2, other (Dependent pitting edema on posterior portion 

of LE and abdomen)


Lungs: CTAB, no respiratory distress, good air movement, no rales/rhonchi, no 

wheezing


Abdomen: soft, non-tender, bowel sounds present


Musculoskeletal: normal tone, other (decreased muscle mass)


Neurological: no focal deficit


Skin: no rash/lesions, no jaundice


Heme/Lymphatic: no unusual bruising or bleeding, no purpura


Psychiatric: normal mood and affect, good judgment and insight, intact recent 

and remote memory





FMR H&P: Results





- Labs


Result Diagrams: 


 05/27/18 04:46





 05/27/18 04:46


Lab results: 


 











WBC  14.4 thou/uL (4.8-10.8)  H  05/27/18  04:46    


 


Hgb  11.4 g/dL (14.0-18.0)  L  05/27/18  04:46    


 


Hct  36.0 % (42.0-52.0)  L  05/27/18  04:46    


 


MCV  108.0 fl (80.0-94.0)  H  05/27/18  04:46    


 


Plt Count  327 thou/uL (130-400)   05/27/18  04:46    


 


Neutrophils %  71.1 % (42.0-75.0)   05/27/18  04:46    


 


Sodium  140 mmol/L (136-145)   05/27/18  04:46    


 


Potassium  3.7 mmol/L (3.5-5.1)   05/27/18  04:46    


 


Chloride  92 mmol/L ()  L  05/27/18  04:46    


 


Carbon Dioxide  39 mmol/L (23-31)  H  05/27/18  04:46    


 


BUN  9 mg/dL (8.4-25.7)   05/27/18  04:46    


 


Creatinine  0.73 mg/dL (0.6-1.3)   05/27/18  04:46    


 


Glucose  130 mg/dL ()  H  05/27/18  04:46    


 


Lactic Acid  1.2 mmol/L (0.5-2.2)   05/27/18  04:46    


 


Calcium  9.2 mg/dL (7.8-10.44)   05/27/18  04:46    


 


Total Bilirubin  0.9 mg/dL (0.2-1.2)   05/27/18  04:46    


 


AST  29 U/L (5-34)   05/27/18  04:46    


 


ALT  12 U/L (8-55)   05/27/18  04:46    


 


Alkaline Phosphatase  61 U/L ()   05/27/18  04:46    


 


CK-MB (CK-2)  3.1 ng/mL (0-6.6)   05/27/18  04:46    


 


B-Natriuretic Peptide  635.1 pg/mL (0-100)  H  05/27/18  04:46    


 


Serum Total Protein  6.7 g/dL (5.8-8.1)   05/27/18  04:46    


 


Albumin  2.9 g/dL (3.4-4.8)  L  05/27/18  04:46    














- EKG Interpretation


EKG: 





afib w/rvr rate 131





- Radiology Interpretation


  ** Chest x-ray


Status: image reviewed by me (Rads read pending. No obvious acute process. 

Lungs appear to hyperinflated c/w COPD)





FMR H&P: A/P





- Problem List


(1) Atrial fibrillation with RVR


Current Visit: No   Status: Acute   Priority: High   Code(s): I48.91 - 

UNSPECIFIED ATRIAL FIBRILLATION   





(2) Elevated brain natriuretic peptide (BNP) level


Current Visit: No   Status: Acute   Priority: Medium   Code(s): R79.89 - OTHER 

SPECIFIED ABNORMAL FINDINGS OF BLOOD CHEMISTRY   





(3) BPH (benign prostatic hyperplasia)


Current Visit: No   Status: Chronic   Priority: Low   Code(s): N40.0 - BENIGN 

PROSTATIC HYPERPLASIA WITHOUT LOWER URINRY TRACT SYMP   





(4) Blindness


Current Visit: No   Status: Chronic   Priority: Low   Code(s): H54.7 - 

UNSPECIFIED VISUAL LOSS   





(5) COPD (chronic obstructive pulmonary disease)


Current Visit: No   Status: Chronic   Priority: Medium   





(6) Macrocytic anemia


Current Visit: No   Status: Chronic   Priority: Low   Code(s): D53.9 - 

NUTRITIONAL ANEMIA, UNSPECIFIED   





(7) Peripheral edema


Current Visit: No   Status: Chronic   Priority: Low   Code(s): R60.9 - EDEMA, 

UNSPECIFIED   





- Plan





New onset afib w/RVR


- admit to tele


- This is likely precipitated by hypovolemia secondary to decreased oral intake

, will give IVF and encourage PO intake 


- rate improved with IV dilt, will continue on floor


- Recent echo shows EF 55-60%


- consult cards 


- first trop negative, continue to trend


- low suspicion for infection. Negative CXR. Order UA





Hypertensive urgency 


- no signs of end organ damage


- initial pressures have resolved and most recent are normal range. Continue to 

monitor and treat as indicated 





Chronic macrocytic anemia 


- asymptomatic and improved from previous admission 





Etoh abuse


- ASE protocol 


-  on cessation 





COPD


- currently at baseline O2 demand


- restart home meds 





HTN


- restart home meds 





BPH


- chronic Plaza dt recent UTI 2/2 obstruction


- continue plaza





Blindness


- baseline 





Elevated BNP


- this has improved from recent admission. Does not appear to be volume 

overloaded. Likely 2/2 afib





Calorie malnutrition 


- consult nutrition 


- add ensure to meals 


- encourage PO intake 





Peripheral edema 


- dependent edema from being positioned on his back almost all of the time. At 

baseline





Code Full





Diet regular 





PPx SCD, high risk of bleed w/medical prophylaxis 





Dispo: Pt is currently stable and asymptomatic. Continue to monitor, develop 

plan with input from cardiology. Likely length of stay 3-4 days





FMR H&P: Upper Level





- Pertinent history


 77 year old  male presents for dyspnea for 2 days. His brother 

is with him today and helped provide history. He states it started gradually.  

He denies fever, chills, chest pain, palpitations, cough, sputum production, AP

, nausea, vomiting.  Had on episode of diarrhea earlier this week.  Patient 

uses 4L O2 at home.  He has been eating and drinking very little recently.





In the ED he was seen by Dr. iCsneros. Given Diltiazem bolus, diltiazem drip, 

aspirin, and 1L NS.





Patient was recently hospitalized for severe sepsis.  He had a PE during that 

hospitalization and got a IVC filter.  Was not anticoagulated due to risk of 

falls and retroperitoneal hematoma after IVC filter.








- Pertinent findings


 Vital Signs


Temp 98.1 RR 21  /85 O2 sats: 97% on 4L Wt 61.2 kg





General Exam


General: NAD, AAOx4.


Eyes: EOMI, PERRL, nonicteric


ENT: MMM. Oropharynx clear. Poor dentition


CV: Tachycardic. Irregularly irregular. No m/r/g. Pulses full and equal in all 

4 extremities


Resp: CTAB. No wheezing, rales, or rhonchi. Breathing unlabored


Abd: Soft. Non-distended, nontender.  No guarding or rebound. BS+ in all 4 

quadrants


Extremities: No clubbing, cyanosis. Trace edema of legs bilaterally. Equal 

movements in all extremities.


Skin: No rash or ulcer. No palpable lesions


Neuro: CII - XII intact. No focal deficits


Psych: Mood and affect appropriate. Judgment and insight intact











- Plan


Date/Time: 05/27/18 0659








Ralph SILVA DO, have evaluated this patient and agree with findings/plan 

as outlined by intern resident. Pertinent changes/additions are listed here.





77 year old male presents with:


1) Atrial fibrillation with RVR - Admit to telemetry. Continue diltiazem drip.  

Consider cardiology consult.  Volume depletion may have contributed. No obvious 

infectious source. Check UA/UCx


2) Dyspnea - Likely 2/2 #1


3) History of pulmonary embolism - Symptoms resolved with treatment of a. fib.  

Suspect that as primary source but recommend reevaluating should symptoms recur 

or not respond to a. fib


4) HTN - Monitor BPs and treat as needed


5) Deconditioning - Consult PT


6) Alcohol abuse - ASE protocol


7) Leukocytosis - No clear source of infection. Check UA


8) Macrocytic anemia - Stable








Attending Addendum





- Attending Addendum


Date/Time: 05/27/18 1040





I personally evaluated the patient and discussed the management with Dr. Frye


I agree with the History, Examination, Assessment and Plan documented above 

with any addition or exceptions noted below.


76 yo with new onset AFIB with RVR rate controlled with IV diltiazem. prior IVC 

placement for recurrent PE and not candidate to anticoagulation due to hematoma 

at that time. Cardiology to be consulted this AM appreciate recommendations.

## 2018-05-27 NOTE — RAD
ONE VIEW CHEST:

 

HISTORY: 

Pain.  Difficulty breathing.

 

COMPARISON: 

5/20/18.

 

FINDINGS: 

Persistent hyperinflation and emphysematous changes.  Stable cardiac silhouette.  Small bilateral ple
ural effusions may be present.  No pneumothorax.

 

IMPRESSION: 

1.  Hyperinflation.  Emphysema.

 

2.  Small bilateral pleural effusions.

 

POS: [facility]

## 2018-05-28 LAB
ANALYZER IN CARDIO: (no result)
ANALYZER IN CARDIO: (no result)
ANION GAP SERPL CALC-SCNC: 8 MMOL/L (ref 10–20)
BASE EXCESS STD BLDA CALC-SCNC: 17 MEQ/L
BASE EXCESS STD BLDA CALC-SCNC: 19.4 MEQ/L
BASOPHILS # BLD AUTO: 0 THOU/UL (ref 0–0.2)
BASOPHILS NFR BLD AUTO: 0.5 % (ref 0–1)
BUN SERPL-MCNC: 5 MG/DL (ref 8.4–25.7)
CA-I BLDA-SCNC: 1.1 MMOL/L (ref 1.12–1.3)
CA-I BLDA-SCNC: 1.2 MMOL/L (ref 1.12–1.3)
CALCIUM SERPL-MCNC: 8.3 MG/DL (ref 7.8–10.44)
CHLORIDE SERPL-SCNC: 92 MMOL/L (ref 98–107)
CO2 SERPL-SCNC: 42 MMOL/L (ref 23–31)
CREAT CL PREDICTED SERPL C-G-VRATE: 92 ML/MIN (ref 70–130)
EOSINOPHIL # BLD AUTO: 0.2 THOU/UL (ref 0–0.7)
EOSINOPHIL NFR BLD AUTO: 2.3 % (ref 0–10)
GLUCOSE SERPL-MCNC: 106 MG/DL (ref 83–110)
HCO3 BLDA-SCNC: 44.9 MEQ/L (ref 22–26)
HCO3 BLDA-SCNC: 50.6 MEQ/L (ref 22–26)
HCT VFR BLDA CALC: 29.2 % (ref 42–52)
HCT VFR BLDA CALC: 34.1 % (ref 42–52)
HGB BLD-MCNC: 10.6 G/DL (ref 14–18)
HGB BLD-MCNC: 9.2 G/DL (ref 14–18)
HGB BLDA-MCNC: 10.1 G/DL (ref 14–18)
HGB BLDA-MCNC: 9.1 G/DL (ref 14–18)
LYMPHOCYTES # BLD: 0.8 THOU/UL (ref 1.2–3.4)
LYMPHOCYTES NFR BLD AUTO: 10 % (ref 21–51)
MACROCYTES BLD QL SMEAR: (no result) (100X)
MAGNESIUM SERPL-MCNC: 1.9 MG/DL (ref 1.6–2.6)
MCH RBC QN AUTO: 33.9 PG (ref 27–31)
MCH RBC QN AUTO: 33.9 PG (ref 27–31)
MCV RBC AUTO: 107 FL (ref 80–94)
MCV RBC AUTO: 109 FL (ref 80–94)
MDIFF COMPLETE?: YES
MONOCYTES # BLD AUTO: 0.8 THOU/UL (ref 0.11–0.59)
MONOCYTES NFR BLD AUTO: 10.7 % (ref 0–10)
NEUTROPHILS # BLD AUTO: 5.8 THOU/UL (ref 1.4–6.5)
NEUTROPHILS NFR BLD AUTO: 76.5 % (ref 42–75)
O2 A-A PPRESDIFF RESPIRATORY: 71.9 MM[HG] (ref 0–20)
PCO2 BLDA: 116 MMHG (ref 35–45)
PCO2 BLDA: 79.2 MMHG (ref 35–45)
PH BLDA: 7.26 [PH] (ref 7.35–7.45)
PH BLDA: 7.37 [PH] (ref 7.35–7.45)
PLATELET # BLD AUTO: 211 THOU/UL (ref 130–400)
PLATELET # BLD AUTO: 306 THOU/UL (ref 130–400)
PLATELET BLD QL SMEAR: (no result)
PO2 BLDA: 114.3 MMHG (ref 80–100)
PO2 BLDA: 47.9 MMHG (ref 80–100)
POTASSIUM BLD-SCNC: 3.2 MMOL/L (ref 3.7–5.3)
POTASSIUM BLD-SCNC: 3.7 MMOL/L (ref 3.7–5.3)
POTASSIUM SERPL-SCNC: 3.4 MMOL/L (ref 3.5–5.1)
RBC # BLD AUTO: 2.71 MILL/UL (ref 4.7–6.1)
RBC # BLD AUTO: 3.13 MILL/UL (ref 4.7–6.1)
SODIUM SERPL-SCNC: 139 MMOL/L (ref 136–145)
SPECIMEN DRAWN FROM PATIENT: (no result)
SPECIMEN DRAWN FROM PATIENT: (no result)
WBC # BLD AUTO: 13 THOU/UL (ref 4.8–10.8)
WBC # BLD AUTO: 7.6 THOU/UL (ref 4.8–10.8)

## 2018-05-28 PROCEDURE — 0BH18EZ INSERTION OF ENDOTRACHEAL AIRWAY INTO TRACHEA, VIA NATURAL OR ARTIFICIAL OPENING ENDOSCOPIC: ICD-10-PCS | Performed by: INTERNAL MEDICINE

## 2018-05-28 PROCEDURE — 0BC68ZZ EXTIRPATION OF MATTER FROM RIGHT LOWER LOBE BRONCHUS, VIA NATURAL OR ARTIFICIAL OPENING ENDOSCOPIC: ICD-10-PCS | Performed by: INTERNAL MEDICINE

## 2018-05-28 PROCEDURE — 5A1945Z RESPIRATORY VENTILATION, 24-96 CONSECUTIVE HOURS: ICD-10-PCS | Performed by: INTERNAL MEDICINE

## 2018-05-28 RX ADMIN — POTASSIUM CHLORIDE SCH: 14.9 INJECTION, SOLUTION INTRAVENOUS at 17:25

## 2018-05-28 RX ADMIN — Medication SCH: at 12:12

## 2018-05-28 RX ADMIN — FAMOTIDINE SCH MG: 10 INJECTION, SOLUTION INTRAVENOUS at 11:07

## 2018-05-28 RX ADMIN — Medication SCH ML: at 20:06

## 2018-05-28 RX ADMIN — POTASSIUM CHLORIDE SCH MLS: 14.9 INJECTION, SOLUTION INTRAVENOUS at 14:27

## 2018-05-28 RX ADMIN — FAMOTIDINE SCH MG: 10 INJECTION, SOLUTION INTRAVENOUS at 20:06

## 2018-05-28 NOTE — OP
DATE OF SERVICE:  05/28/2018

 

PROCEDURE:  Fiberoptic intubation.

 

:  Geraldo Cristobal M.D.

 

INDICATION:  Respiratory failure.

 

Bronchoscope was brought to the bedside.  Bite block was placed in Mr. Esteves's mouth.  He cooperate
d with this.  Scope was passed in his posterior pharyngeal air space.  Copious secretions were encoun
tered around his glottis and into the proximal part of his trachea was easily intubated.  Tube was po
sitioned above the main rolly and secured in place.  He had right lower lobe mucous plugging on endo
scopic exam.  No endobronchial lesions were otherwise seen.  All the secretions in his trachea were s
uctioned clear.  He tolerated the procedure well.  He was connected to mechanical ventilation, was gi
king a fluid bolus and started on sedation.

## 2018-05-28 NOTE — CON
DATE OF CONSULTATION:  05/27/2018

 

HISTORY:  Xavier Esteves is a pleasant, 77-year-old, black male, who was just 
hospitalized here on 05/08/2018.  He was diagnosed as having severe sepsis 
during that admission, also he was found to have bilateral lower lobe pulmonary 
emboli.  Abdominal and pelvis CT showed a large right retroperitoneal hematoma 
involving the iliacus muscle.  Ultrasound of lower extremities revealed no 
evidence of DVT.  It was felt that he should not be anticoagulated due to being 
blind in a significant fall risk and ultimately an inferior vena cava filter 
was placed.  He ultimately was discharged on 05/20/2018.

 

He now returns complaining of an increased shortness of breath over the last 2 
days.  He denies any chest pain or palpitations.  In the emergency room, he was 
noted to be in atrial fibrillation with fast ventricular response of 132 per 
minute.  He was started on Cardizem drip, and ultimately, he has converted to 
sinus rhythm.

 

PAST MEDICAL HISTORY:  Significant for hypertension, chronic hepatitis B, 
history of PE, history of retroperitoneal hematoma, blindness, COPD, 
hypertension, BPH.

 

SURGERY:  IVC filter.

 

HOME MEDICATIONS:  Proscar 5 mg daily, Bystolic 10 mg daily, triamterene/
hydrochlorothiazide 1 daily, amlodipine/olmesartan (Pipo) 5/20 daily, 
multivitamin daily, Symbicort 1 puff daily, ferrous sulfate 325 b.i.d., Folvite 
1 mg daily, furosemide 20 mg p.r.n., thiamine 100 mg daily.

 

ALLERGIES:  None.

 

SOCIAL HISTORY:  He has a 45-pack-year smoking history.  He drinks two-fifths 
of whiskey weekly and use marijuana.

 

REVIEW OF SYSTEMS:  Twelve-point review of systems otherwise unremarkable.

 

PHYSICAL EXAMINATION:

VITAL SIGNS:  Blood pressure 150/76, pulse of 82.

HEENT:  PERRL.

NECK:  Supple.

CHEST:  Reveals distant breath sounds.

CARDIAC:  S1 and S2 are normal, without any S3, S4 or murmurs.

ABDOMEN:  Normal bowel sounds.

EXTREMITIES:  Revealed 1+ pretibial edema.

NEUROLOGIC:  Grossly intact except for blindness.

 

LABORATORY AND DIAGNOSTIC DATA:  EKG revealed atrial fibrillation with fast 
ventricular response of 131 per minute with low voltage.  Hemoglobin 11.4, 
hematocrit 36.0, white count 14,400.  Cardiac enzymes were unremarkable.  TSH 
was normal.  Sodium 140, potassium 3.7, chloride 92, carbon dioxide 39, BUN 9, 
creatinine 0.73.

 

ASSESSMENT:

1.  New onset atrial fibrillation with fast ventricular response will certainly 
could be related to his pulmonary emboli.  His rate has been controlled with 
intravenous diltiazem and he was then converted to sinus rhythm.

2.  Not an anticoagulation candidate due to blindness and concern of frequent 
falls as well as a finding of a retroperitoneal hematoma on last admission.

3.  History of bilateral pulmonary emboli with inferior vena cava filter 
placement 2 weeks ago.

4.  Blindness.

5.  Chronic obstructive pulmonary disease.

6.  EtOH abuse.

7.  Hypertension.

8.  Anemia.

 

RECOMMENDATIONS:  Mr. Esteves is not a candidate for anticoagulation.  He has 
converted to sinus rhythm.  We will place him on Multaq in the hopes of 
maintaining sinus rhythm, going forward.  Echocardiogram has been repeated.  He 
continued to have normal left ventricular function and no significant changes 
from before.  I will follow the patient with you.

 

JOHN

## 2018-05-28 NOTE — EKG
Test Reason : 

Blood Pressure : ***/*** mmHG

Vent. Rate : 131 BPM     Atrial Rate : 138 BPM

   P-R Int : 000 ms          QRS Dur : 070 ms

    QT Int : 272 ms       P-R-T Axes : 000 060 098 degrees

   QTc Int : 401 ms

 

Atrial fibrillation with rapid ventricular response

Low voltage QRS

Nonspecific T wave abnormality , probably digitalis effect

Abnormal ECG

 

Confirmed by BUD ELAM ([id number]),  SIRI CHENG ([id number]) on 5/28/2018 2:15:35 PM

 

Referred By:             Confirmed By:BUD ELAM

## 2018-05-28 NOTE — PDOC.EVN
Event Note





- Event Note


Event Note: 





Patient's brother, Rory, was in the hospital this this morning and indicated 

the family had talked after Mr. Esteves was intubated and now would not want 

him to have CPR if he had cardiac arrest. He requested I call Mr. Esteves's 

wife to discuss this further. I called Mrs. Emilee Esteves and informed her that 

we have to do what Mr. Esteves wanted prior to being intubated- which is full 

code. She indicated they discussed as a family and do not want "his ribs 

cracked in order to do CPR." She understood that we must carry out Mr. Esteves'

s wishes. I explained that for now we are waiting to see if Mr. Esteves will be 

weanable from the vent, but there could come a time when he would require a 

tracheostomy. She indicated that a trach is something he never wanted. I 

informed her that we would keep her updated on his status. She stated, "please 

call day or night." She also stressed that she is in very bad medical condition 

herself and if she came up here she may need to be admitted herself. I told her 

for now she did not need to come to the hospital tonight, but it may be helpful 

in the coming days to have a family meeting. She does note Mr. Esteves having a 

MPOA and his brother will bring it to the hospital however they do not have a 

living will.

## 2018-05-28 NOTE — PRG
DATE OF SERVICE:  05/28/2018

 

CRITICAL CARE NOTE

 

SUBJECTIVE:  Mr. Esteves was transferred to the Critical Care Unit with a pCO2 
greater than 100 and unresponsiveness.  I was consulted to intubate him.

 

I saw him in consultation on the intermediate care unit earlier this admission.

 

He was seen yesterday by Cardiology.

 

He is extremely cachectic.

 

He has had an inferior vena cava filter placed for thromboembolic disease, and 
was felt to be at high risk for anticoagulation.

 

No significant obstructive lung disease and significant cachexia.

 

PHYSICAL EXAMINATION:

GENERAL:  He is poorly responsive.  He would follow commands if stimulated.

VITAL SIGNS:  He is afebrile, heart rate is in the 70s, respiratory rate is 18, 
oximetry is 99.

LUNGS:  Remarkable for distant breath sounds.

CARDIOVASCULAR:  Regular rhythm.

ABDOMEN:  Soft.

 

LABORATORY DATA:  PH 7.26, CO2 of 116, pO2 of 47.

 

Sodium 139, potassium 3.4, chloride 92, bicarbonate 42, BUN 5, creatinine 0.6.

 

White count 13, hemoglobin 10.6, platelets 306,000.

 

IMPRESSION:

1.  Extreme cachexia with obstructive lung disease.

2.  Blindness.

3.  Chronic liver disease.

4.  Recent urine culture showing multiple pathogens and venous blood showing 
multiple pathogens as well that matched urine cultures.

 

He is a FULL CODE.

 

I believe he is approaching end of life, just simply because of his cachexia.  
Recommend intubation and support.  He would benefit from a tracheostomy if 
family wants to continue with aggressive care.  He will obviously require 
placement in a fulltime care environment.  Palliative care input will be 
appreciated.

 

Critical care time 40 minutes excluding procedures.
JOHN

## 2018-05-28 NOTE — RAD
ONE VIEW CHEST:

 

History: Respiratory distress. Intubated patient. 

 

Comparison: 5-27-18

 

FINDINGS: 

Interval placement of endotracheal and nasogastric tubes. The side hole of the nasogastric tube is li
lavonne just beyond the GE junction. Atherosclerosis of the aorta. Normal cardiac silhouette. The lungs 
are hyperinflated. Stable pleural and parenchymal changes in the lung bases. No pneumothorax. 

 

IMPRESSION: 

1. Interval placement of endotracheal and nasogastric tube as above. 

2. Hyperinflation. Pleural and parenchymal changes in the lung bases are redemonstrated.

 

POS: [facility]

## 2018-05-28 NOTE — ADD-PRG
DATE OF SERVICE:  05/28/2018

 

This is an addendum to the note of Dr. Yariel Chatman.  Before I came on this morning, Mr. Esteves sust
ained respiratory arrest and was subsequently intubated and transferred to ICU.  He is currently on a
 ventilator with stable settings and is also being followed by Dr. Cristobal.

 

CURRENT VITAL SIGNS:  Blood pressure is now 110/64.  His pulse rate is 66.  We will continue to follo
w with the respiratory Service.

## 2018-05-28 NOTE — PDOC.EVN
Event Note





- Event Note


Event Note: 


see Dr. Chatman event note from this am; however, pt noted to be somnolent this am

, and labs significant for elevated bicarb at 42. Stat ABG ordered and code 

green called. Pt found to have pH 7.2, Co2 116. Transferred to ICU for bipap 

and possible intubation.


Discussion was had with NOK, wife, Emilee Esteves, who reinforces pt to be full 

code and to proceed with intubation if required.

## 2018-05-28 NOTE — PDOC.FM
- Subjective


Subjective: 





Patient intubated and sedated s/p code green this morning. Please see event 

notes for further details. Family is en route at this time.





- Objective


MAR Reviewed: Yes


Vital Signs & Weight: 


 Vital Signs (12 hours)











  Temp Pulse Pulse Pulse Pulse Resp Resp


 


 05/28/18 10:28   66     


 


 05/28/18 08:59   93     


 


 05/28/18 08:04  98.2 F  83  83  80  90  32 H  32 H


 


 05/28/18 04:00  97.9 F  71     18 














  Resp Resp BP BP BP BP BP


 


 05/28/18 10:28    110/64    


 


 05/28/18 08:59    109/71    


 


 05/28/18 08:04  32 H  28 H   180/80 H  177/87 H  132/73  180/80 H


 


 05/28/18 04:00       














  BP Pulse Ox Pulse Ox Pulse Ox Pulse Ox


 


 05/28/18 10:28     


 


 05/28/18 08:59     


 


 05/28/18 08:04   77 L  73 L  98  100


 


 05/28/18 04:00  127/65  99   








 Weight











Weight                         64.3 kg














I&O: 


 











 05/27/18 05/28/18 05/29/18





 06:59 06:59 06:59


 


Intake Total  790 


 


Output Total  1500 


 


Balance  -710 











Result Diagrams: 


 05/28/18 08:06





 05/28/18 05:00





Phys Exam





- Physical Examination


intubated and sedated; severely cachectic


Neck: no JVD


Respiratory: no rales, no rhonchi


Cardiovascular: RRR, no significant murmur


Gastrointestinal: soft, non-tender, no distention


Musculoskeletal: no edema


Skin: no rash





Dx/Plan


(1) Acute on chronic respiratory failure with hypercapnia


Code(s): J96.22 - ACUTE AND CHRONIC RESPIRATORY FAILURE WITH HYPERCAPNIA   

Status: Acute   


Plan: 


-intubated per Pulmonology


-continue sedation with propofol


-repeat ABGs daily


-Vent setting still being optomized at the time of this note








(2) History of pulmonary embolism


Code(s): Z86.711 - PERSONAL HISTORY OF PULMONARY EMBOLISM   Status: Acute   


Plan: 


-last hospitalization suffered bilateral pulmonary emboli and underwent IVC 

filter placement


-no anticoagulation due to risk factors








(3) Atrial fibrillation with RVR


Code(s): I48.91 - UNSPECIFIED ATRIAL FIBRILLATION   Status: Acute   


Plan: 


-evaluated by Cardiology who recommend Multaq; also recommend against 

anticoagulation


-converted to SR overnight with dilt gtt








(4) Retroperitoneal hematoma


Code(s): K66.1 - HEMOPERITONEUM   Status: Resolved   


Plan: 


-s/p IVC filter placement during last hospitalization


-Hg stable, continue to monitor








(5) BPH (benign prostatic hyperplasia)


Code(s): N40.0 - BENIGN PROSTATIC HYPERPLASIA WITHOUT LOWER URINRY TRACT SYMP   

Status: Chronic   


Plan: 


-chronic indwelling plaza catheter due to severe BPH


-treated during last hospitalization for sepsis 2/2 UTI








(6) Blindness


Code(s): H54.7 - UNSPECIFIED VISUAL LOSS   Status: Chronic   





(7) COPD (chronic obstructive pulmonary disease)


Status: Chronic   





(8) Macrocytic anemia


Code(s): D53.9 - NUTRITIONAL ANEMIA, UNSPECIFIED   Status: Chronic   


Plan: 


-hg improved from previous admission likely 2/2 hemoconcentration








(9) Malnourished


Code(s): E46 - UNSPECIFIED PROTEIN-CALORIE MALNUTRITION   Status: Chronic   





- Plan


Plan: 





-consult palliative for goals of care; unfavorable prognosis at this time


-will meet with family as they arrive


-continue intubation and sedation


-will start multaq for new onset a-fib RVR; no anticoagulation

## 2018-05-28 NOTE — PDOC.EVN
Event Note





- Event Note


Event Note: 





Patient with a bicarb of 42 this morning. He was somnolent with gasping breaths

, a significant change from his baseline. A code green was called and and ABG 

showed 7.258/116/48.


Prior records show he is a full code and was admitted under full code status 

yesterday. Patients wife was called and confirmed that status. Patient was 

moved to ICU and intubated and sedated with propofol.

## 2018-05-29 LAB
ANALYZER IN CARDIO: (no result)
ANION GAP SERPL CALC-SCNC: 14 MMOL/L (ref 10–20)
BASE EXCESS STD BLDA CALC-SCNC: 12.1 MEQ/L
BUN SERPL-MCNC: 6 MG/DL (ref 8.4–25.7)
CA-I BLDA-SCNC: 1.1 MMOL/L (ref 1.12–1.3)
CALCIUM SERPL-MCNC: 8.5 MG/DL (ref 7.8–10.44)
CHLORIDE SERPL-SCNC: 98 MMOL/L (ref 98–107)
CO2 SERPL-SCNC: 30 MMOL/L (ref 23–31)
CREAT CL PREDICTED SERPL C-G-VRATE: 91 ML/MIN (ref 70–130)
GLUCOSE SERPL-MCNC: 95 MG/DL (ref 83–110)
HCO3 BLDA-SCNC: 37.6 MEQ/L (ref 22–26)
HCT VFR BLDA CALC: 26.8 % (ref 42–52)
HGB BLD-MCNC: 11.6 G/DL (ref 14–18)
HGB BLDA-MCNC: 7.7 G/DL (ref 14–18)
MAGNESIUM SERPL-MCNC: 2 MG/DL (ref 1.6–2.6)
MCH RBC QN AUTO: 34.6 PG (ref 27–31)
MCV RBC AUTO: 108 FL (ref 80–94)
MDIFF COMPLETE?: YES
PCO2 BLDA: 56.4 MMHG (ref 35–45)
PH BLDA: 7.44 [PH] (ref 7.35–7.45)
PLATELET # BLD AUTO: 157 THOU/UL (ref 130–400)
PO2 BLDA: 60.6 MMHG (ref 80–100)
POTASSIUM BLD-SCNC: 2.7 MMOL/L (ref 3.7–5.3)
POTASSIUM SERPL-SCNC: 4 MMOL/L (ref 3.5–5.1)
RBC # BLD AUTO: 3.33 MILL/UL (ref 4.7–6.1)
SODIUM SERPL-SCNC: 138 MMOL/L (ref 136–145)
SPECIMEN DRAWN FROM PATIENT: (no result)
WBC # BLD AUTO: 13.2 THOU/UL (ref 4.8–10.8)

## 2018-05-29 RX ADMIN — ALBUMIN HUMAN SCH GM: 250 SOLUTION INTRAVENOUS at 06:31

## 2018-05-29 RX ADMIN — Medication SCH ML: at 20:24

## 2018-05-29 RX ADMIN — Medication SCH ML: at 07:46

## 2018-05-29 RX ADMIN — ALBUMIN HUMAN SCH GM: 250 SOLUTION INTRAVENOUS at 13:36

## 2018-05-29 RX ADMIN — FAMOTIDINE SCH MG: 10 INJECTION, SOLUTION INTRAVENOUS at 10:13

## 2018-05-29 RX ADMIN — ALBUMIN HUMAN SCH GM: 250 SOLUTION INTRAVENOUS at 23:15

## 2018-05-29 RX ADMIN — FAMOTIDINE SCH: 10 INJECTION, SOLUTION INTRAVENOUS at 20:38

## 2018-05-29 RX ADMIN — ALBUMIN HUMAN SCH GM: 250 SOLUTION INTRAVENOUS at 17:48

## 2018-05-29 NOTE — RAD
PORTABLE SEMIUPRIGHT FRONTAL CHEST RADIOGRAPH:

 

Date:  05/29/18 

 

COMPARISON:  

05/28/18. 

 

HISTORY:  

Ventilated CCU patient. 

 

FINDINGS:

Stable endotracheal tube and nasogastric tube. Linear density in both upper lobes is again noted, marcello
dence of emphysematous change. Nasogastric tube extends into the left upper quadrant with side port a
t gastroesophageal junction. 

 

There is hazy bibasilar density, which is unchanged when compared to the prior examination, and sugge
sts a combination of nonspecific pleural fluid and air space disease. These findings are more promine
nt on the left than on the right. 

 

IMPRESSION: 

Alveolar and pleural opacity in both lung bases, left greater than right, stable. 

 

 

POS: [facility]

## 2018-05-29 NOTE — PDOC.FM
- Subjective


Subjective: 





Patient intubated and sedated. Per nursing staff, he was fighting the ET all 

night when sedation was attempted to be weaned with high peak pressures. 





- Objective


MAR Reviewed: Yes


Vital Signs & Weight: 


 Vital Signs (12 hours)











  Temp Pulse Resp BP


 


 05/29/18 04:00  97.9 F   


 


 05/29/18 02:20   90   139/79


 


 05/29/18 01:00  97.6 F   


 


 05/28/18 23:53   101 H   83/55 L


 


 05/28/18 22:32   118 H   141/79 H


 


 05/28/18 20:00  97.9 F  90  12 








 Weight











Admit Weight                   63.957 kg


 


Weight                         75.3 kg











 Most Recent Monitor Data











Heart Rate from ECG            90


 


NIBP                           111/66


 


NIBP BP-Mean                   81


 


Respiration from ECG           12


 


SpO2                           98














I&O: 


 











 05/28/18 05/29/18 05/30/18





 06:59 06:59 06:59


 


Intake Total 790 6637.9 


 


Output Total 1500 475 0


 


Balance -710 6162.9 0











Result Diagrams: 


 05/29/18 04:21





 05/29/18 04:21


EKG Reviewed by me: Yes (NSR)





<Shobha Cuellar - Last Filed: 05/29/18 08:55>





- Objective


Vital Signs & Weight: 


 Vital Signs (12 hours)











  Temp Pulse Resp BP Pulse Ox


 


 05/29/18 08:00    12  


 


 05/29/18 07:53  97.6 F    


 


 05/29/18 07:39   89   125/69 


 


 05/29/18 07:34   104 H  12   99


 


 05/29/18 07:12  97.6 F  90  12   100


 


 05/29/18 04:00  97.9 F    


 


 05/29/18 02:20   90   139/79 


 


 05/29/18 01:00  97.6 F    


 


 05/28/18 23:53   101 H   83/55 L 


 


 05/28/18 22:32   118 H   141/79 H 








 Weight











Admit Weight                   63.957 kg


 


Weight                         75.3 kg











 Most Recent Monitor Data











Heart Rate from ECG            147


 


NIBP                           108/70


 


NIBP BP-Mean                   79


 


Respiration from ECG           12


 


SpO2                           99














I&O: 


 











 05/28/18 05/29/18 05/30/18





 06:59 06:59 06:59


 


Intake Total 790 6637.9 114


 


Output Total 1500 475 5


 


Balance -710 6162.9 109











Result Diagrams: 


 05/29/18 04:21





 05/29/18 04:21





<Alden Cook - Last Filed: 05/29/18 10:24>





Phys Exam





- Physical Examination


Constitutional: NAD


intubated, sedated


rhales bilaterally, poor air movement bilaterally, minimal wheezing 


Cardiovascular: RRR, no significant murmur


distant hear sounds 


Gastrointestinal: soft, non-tender


4+pitting edema in BLE 


sedated





<PoloShobha MARIO - Last Filed: 05/29/18 08:55>





Dx/Plan


(1) Acute on chronic respiratory failure with hypercapnia


Code(s): J96.22 - ACUTE AND CHRONIC RESPIRATORY FAILURE WITH HYPERCAPNIA   

Status: Acute   


Plan: 


- moved to ICU and intubated on 5/28 after CO2 on ABG of 116 with agonal 

breathing


-currently on pressure support of 10, peep of 5 and rate of 12. Heavily sedated 

due to fighting the ET all night.


-Attempting to wean sedation again. 


mgmt per pulmonology  








(2) Atrial fibrillation with RVR


Code(s): I48.91 - UNSPECIFIED ATRIAL FIBRILLATION   Status: Resolved   


Plan: 


-presented with a fib RVR and spontaneously converted to NSR


--was being continued on multaq but currently NPO 


-continues to be in NSR 








(3) Hypoalbuminemia due to protein-calorie malnutrition


Code(s): E46 - UNSPECIFIED PROTEIN-CALORIE MALNUTRITION   Status: Chronic   


Plan: 


-will discuss goals of care today, palliative care consulted. 


-may need to initiate tube feeds 


-started on albumin q 6 hrs by pulm








(4) Edema due to hypoalbuminemia


Code(s): R60.9 - EDEMA, UNSPECIFIED; E88.09 - OTH DISORDERS OF PLASMA-PROTEIN 

METABOLISM, NEC   Status: Chronic   


Plan: 


albumin q6hrs


-subsequent poor urinary output 








(5) History of pulmonary embolism


Code(s): Z86.711 - PERSONAL HISTORY OF PULMONARY EMBOLISM   Status: Chronic   


Plan: 


s/p IVC filter








(6) BPH (benign prostatic hyperplasia)


Code(s): N40.0 - BENIGN PROSTATIC HYPERPLASIA WITHOUT LOWER URINRY TRACT SYMP   

Status: Chronic   


Plan: 


-chronic indwelling catheter.








(7) Alcohol abuse


Code(s): F10.10 - ALCOHOL ABUSE, UNCOMPLICATED   Status: Chronic   





(8) Blindness


Code(s): H54.7 - UNSPECIFIED VISUAL LOSS   Status: Chronic   





(9) COPD (chronic obstructive pulmonary disease)


Status: Chronic   


Plan: 


may benefit from a round of steroids. 








(10) Macrocytic anemia


Code(s): D53.9 - NUTRITIONAL ANEMIA, UNSPECIFIED   Status: Chronic   





(11) Malnourished


Code(s): E46 - UNSPECIFIED PROTEIN-CALORIE MALNUTRITION   Status: Chronic   





(12) Retroperitoneal hematoma


Code(s): K66.1 - HEMOPERITONEUM   Status: Resolved   


Plan: 


-occured during recent hospitalization after being placed on heparin for Jeffery PE'

s and then IVC filter placed. 


-hgb improved and stable. 








<Shobha Cuellar - Last Filed: 05/29/18 08:55>





Attending Addendum





- Attending Addendum


Date/Time: 05/29/18 1019





I personally evaluated the patient and discussed the management with Dr. Cuellar 

and team.


I agree with and repeated the History, Examination, Assessment and Plan 

documented above with any addition or exceptions noted below.





On propofol gtt and intubated.  Limited history and ROS.  Intubated yesterday 

for acute hypercapneic hypoxic resp failure.





Agree with exam.  Bedside 2 point DVT screen performed with POCUS.  CFV and PV 

bilaterally compressible with + augmentation bilaterally along 5 cm at each 

point and both legs.  





Neuro: RAVI per nurse, on propofol gtt, APAP prn


Resp: on MV, lung protective strategy, being mucomyst, nebs PRN


CV: afib with RVR overnight and in room, intermittently breaks with sinus 

tachycardia; on dronedarone and will give metoprolol IV x 1


FEN/GI: place on PPI, being TF when able and d/c IVF/alb as third spacing and 

malnourished


: FC in place, would diuresis as s/p 6 L plus multiple of albumin


Heme/ID: stable; mild WBC with neg PCT, always c/f aspiration post code; will 

monitor


DVT ppx: only IVC filter as recent retroperitoneal bleed and very high fall 

risk when not intubated/sedated.  Pending family discussion will discuss when 

we can restart pharm ppx in light of critical illness.





Family meeting and palliative consultation planned for today.  Poor prognosis 

overall.











<Alden Cook - Last Filed: 05/29/18 10:24>

## 2018-05-29 NOTE — PDOC.EVN
Event Note





- Event Note


Event Note: 





There was a family meeting with Mr. Esteves's wife Emilee and brothers Rory and 

Arturo today at 1700. Dr. Cook, Patricia Garcia of Palliative 

care and myself were present. Family revealed that they together do not wish to 

have heroic resuscitative measures in the event of cardiac arrest. Emilee, Mr. Esteves's MPOA, verbalized to make the patient DNR. She does not believe that 

is what he would want. She has also acknowledged his quality of life to be 

poor. In regards to a tracheostomy, the family, including his wife, noted that 

he would not want to live with a tracheostomy. A discussion was had about how 

to proceed with weaning the patient from the vent vs rapid extubation. Family 

desires to give him a few more days to see how he does with weaning from the 

vent. They understand that there is no sure prognosis after extubation at this 

time. 


Family was very appreciative of the care Mr. Esteves has been receiving. I 

informed them that I would keep them updated on his progress.  





<Shobha Cuellar - Last Filed: 05/29/18 17:25>





Attending Addendum





- Attending Addendum


Date/Time: 05/30/18 0707





I was present for the entire family meeting.





<Alden Cook - Last Filed: 05/30/18 07:07>

## 2018-05-29 NOTE — PRG
DATE OF SERVICE:  05/29/2018

 

SUBJECTIVE:  Mr. Esteves is clinically unchanged.  When he was given a sedation 
holiday, he very quickly nods that he wants the tube out and he also very 
quickly nod that he does not want the tube put back in.

 

PHYSICAL EXAMINATION:

VITAL SIGNS:  His heart rate is 94, blood pressure 111/60, respiratory rate is 
12.

LUNGS:  Distant and clear.

HEART:  Regular rhythm.

ABDOMEN:  Soft.

 

Family has relayed to the nurse's staff that they want him to be a DO NOT 
RESUSCITATE patient.

 

His palliative care meeting this afternoon.

 

LABORATORY DATA:  White count 13.2, hemoglobin 11.6, platelets 157.  Sodium 138
, potassium 4, chloride 98, bicarbonate 30, BUN 6, creatinine 0.64.

 

IMPRESSION:

1.  Respiratory failure secondary to chronic obstructive pulmonary disease 
exacerbation combined with severe muscle wasting.

2.  Blindness.

3.  Heavy alcohol use.

4.  Chronic respiratory failure with blood gas now that is closer probably to 
his baseline.  I suspect his baseline pCO2 is about 65.  His pH today is 7.44, 
pCO2 56, pO2 of 60.

 

Withdrawal of care at any time would be appropriate in my opinion.  I do not 
think he will pass away once he is extubated.

 

Critical care time 35 minutes.
[facility]

## 2018-05-30 LAB
ALBUMIN SERPL BCG-MCNC: 3.3 G/DL (ref 3.4–4.8)
ALP SERPL-CCNC: 41 U/L (ref 40–150)
ALT SERPL W P-5'-P-CCNC: 8 U/L (ref 8–55)
ANALYZER IN CARDIO: (no result)
ANALYZER IN CARDIO: (no result)
ANION GAP SERPL CALC-SCNC: 11 MMOL/L (ref 10–20)
AST SERPL-CCNC: 23 U/L (ref 5–34)
BASE EXCESS STD BLDA CALC-SCNC: 10.4 MEQ/L
BASE EXCESS STD BLDA CALC-SCNC: 7.9 MEQ/L
BILIRUB DIRECT SERPL-MCNC: 0.9 MG/DL (ref 0.1–0.3)
BILIRUB SERPL-MCNC: 1.6 MG/DL (ref 0.2–1.2)
BUN SERPL-MCNC: 5 MG/DL (ref 8.4–25.7)
CA-I BLDA-SCNC: 1.1 MMOL/L (ref 1.12–1.3)
CA-I BLDA-SCNC: 1.1 MMOL/L (ref 1.12–1.3)
CALCIUM SERPL-MCNC: 8.4 MG/DL (ref 7.8–10.44)
CHLORIDE SERPL-SCNC: 96 MMOL/L (ref 98–107)
CO2 SERPL-SCNC: 36 MMOL/L (ref 23–31)
CREAT CL PREDICTED SERPL C-G-VRATE: 107 ML/MIN (ref 70–130)
GLUCOSE SERPL-MCNC: 119 MG/DL (ref 83–110)
HCO3 BLDA-SCNC: 34.5 MEQ/L (ref 22–26)
HCO3 BLDA-SCNC: 35.9 MEQ/L (ref 22–26)
HCT VFR BLDA CALC: 25.7 % (ref 42–52)
HCT VFR BLDA CALC: 29.4 % (ref 42–52)
HGB BLD-MCNC: 7.8 G/DL (ref 14–18)
HGB BLD-MCNC: 8.2 G/DL (ref 14–18)
HGB BLDA-MCNC: 7.8 G/DL (ref 14–18)
HGB BLDA-MCNC: 8.3 G/DL (ref 14–18)
MAGNESIUM SERPL-MCNC: 1.8 MG/DL (ref 1.6–2.6)
MCH RBC QN AUTO: 34.8 PG (ref 27–31)
MCV RBC AUTO: 106 FL (ref 80–94)
MDIFF COMPLETE?: YES
PCO2 BLDA: 55.4 MMHG (ref 35–45)
PCO2 BLDA: 61.6 MMHG (ref 35–45)
PH BLDA: 7.37 [PH] (ref 7.35–7.45)
PH BLDA: 7.43 [PH] (ref 7.35–7.45)
PLATELET # BLD AUTO: 132 THOU/UL (ref 130–400)
PLATELET # BLD AUTO: 138 THOU/UL (ref 130–400)
PO2 BLDA: 40.1 MMHG (ref 80–100)
PO2 BLDA: 59.6 MMHG (ref 80–100)
POTASSIUM BLD-SCNC: 3 MMOL/L (ref 3.7–5.3)
POTASSIUM BLD-SCNC: 3.3 MMOL/L (ref 3.7–5.3)
POTASSIUM SERPL-SCNC: 3.2 MMOL/L (ref 3.5–5.1)
RBC # BLD AUTO: 2.23 MILL/UL (ref 4.7–6.1)
SODIUM SERPL-SCNC: 140 MMOL/L (ref 136–145)
SPECIMEN DRAWN FROM PATIENT: (no result)
SPECIMEN DRAWN FROM PATIENT: (no result)
WBC # BLD AUTO: 6.9 THOU/UL (ref 4.8–10.8)

## 2018-05-30 RX ADMIN — ALBUMIN HUMAN SCH GM: 250 SOLUTION INTRAVENOUS at 06:38

## 2018-05-30 RX ADMIN — Medication SCH ML: at 08:17

## 2018-05-30 RX ADMIN — Medication SCH ML: at 20:15

## 2018-05-30 NOTE — RAD
CHEST 1 VIEW:

 

HISTORY:  

Ventilated patient.

 

COMPARISON: 

Chest radiograph prior day.

 

FINDINGS: 

The patient appears to be intubated with endotracheal tube tip in good position.  Enteric tube side p
ort is just below the GE junction.  Layering bilateral pleural effusions.  There are bibasilar airspa
ce opacities.

 

The cardiac silhouette and mediastinal contour is similar.  No acute osseous abnormality.

 

IMPRESSION: 

1.  Similar appearance to slight worsening of the bilateral lower lobe effusion and consolidation.

 

2.  Moderate edema.

 

POS: [facility]

## 2018-05-30 NOTE — PDOC.FM
Addendum entered and electronically signed by Shobha Cuellar MD  05/30/18 09:56

: 





PE:


Gen: intubated, mild sedation, following commands


cardiac: RRR, no M/R/G


Lungs: rhales/rhonchi bilaterally, some air movement, no wheezing 


abdomen: nontender, nondistended, 





Original Note:








- Subjective


Subjective: 





Patient intubated and sedated. He follows commands this morning. He shakes his 

head no when asked if he has any pain. He is taking some spontaneous breaths 

with small tidal volume. 





- Objective


MAR Reviewed: Yes


Vital Signs & Weight: 


 Vital Signs (12 hours)











  Temp Pulse Resp BP Pulse Ox


 


 05/30/18 08:00  98.0 F   22 H  


 


 05/30/18 07:14  98.0 F  90  12   93 L


 


 05/30/18 06:49   82   


 


 05/30/18 04:00  97.8 F    


 


 05/30/18 02:44   104 H   109/83 


 


 05/30/18 00:25   88   127/81 


 


 05/30/18 00:00  97.7 F    


 


 05/29/18 22:32   86   123/71 


 


 05/29/18 22:00    21 H  








 Weight











Admit Weight                   63.957 kg


 


Weight                         74.5 kg











 Most Recent Monitor Data











Heart Rate from ECG            94


 


NIBP                           145/79


 


NIBP BP-Mean                   92


 


Respiration from ECG           22


 


SpO2                           98














I&O: 


 











 05/29/18 05/30/18 05/31/18





 06:59 06:59 06:59


 


Intake Total 6637.9 4271.5 


 


Output Total 475 2307 335


 


Balance 6162.9 1964.5 -335











Result Diagrams: 


 05/30/18 08:22





 05/30/18 04:34


Radiology Reviewed by me: Yes





<Shobha Cuellar - Last Filed: 05/30/18 09:43>





- Objective


Vital Signs & Weight: 


 Vital Signs (12 hours)











  Temp Pulse Resp Pulse Ox


 


 05/30/18 14:58   91  


 


 05/30/18 13:06   87  


 


 05/30/18 12:00  97.7 F   12 


 


 05/30/18 10:45   103 H  


 


 05/30/18 10:00    36 H 


 


 05/30/18 08:00  98.0 F   22 H 


 


 05/30/18 07:14  98.0 F  90  12  93 L


 


 05/30/18 06:49   82  


 


 05/30/18 04:00  97.8 F   








 Weight











Admit Weight                   63.957 kg


 


Weight                         74.5 kg











 Most Recent Monitor Data











Heart Rate from ECG            94


 


NIBP                           119/71


 


NIBP BP-Mean                   75


 


Respiration from ECG           12


 


SpO2                           100














I&O: 


 











 05/29/18 05/30/18 05/31/18





 06:59 06:59 06:59


 


Intake Total 6637.9 4271.5 876


 


Output Total 475 2307 804


 


Balance 6162.9 1964.5 72











Result Diagrams: 


 05/30/18 08:22





 05/30/18 04:34





<Alden Cook - Last Filed: 05/30/18 15:12>





Phys Exam





- Physical Examination


Constitutional: NAD


4 + pitting bilateral LE edema to calves





<Shobha Cuellar - Last Filed: 05/30/18 09:43>





Dx/Plan


(1) Acute on chronic respiratory failure with hypercapnia


Code(s): J96.22 - ACUTE AND CHRONIC RESPIRATORY FAILURE WITH HYPERCAPNIA   

Status: Acute   


Plan: 


- moved to ICU and intubated on 5/28 after CO2 on ABG of 116 with agonal 

breathing


-currently on pressure support of 10, peep of 5 and rate of 12. Seems to have 

calmed down some and sedation able to be lessened 


- Plan to try and wean from vent over next couple days and eventual extubation 

without trach placement per family wishes.   








(2) COPD with exacerbation


Code(s): J44.1 - CHRONIC OBSTRUCTIVE PULMONARY DISEASE W (ACUTE) EXACERBATION   

Status: Acute   


Plan: 


started on steroids. 


will hold off on abx, procal wnl.


start inhaled steroids 


cont duonebs











(3) Acute anemia


Code(s): D64.9 - ANEMIA, UNSPECIFIED   Status: Acute   


Plan: 


check LFTs. 


No abdominal pain, do not suspect he has had further expansion of 

retroperitoneal hematoma but will cont to monitor H/H








(4) Atrial fibrillation with RVR


Code(s): I48.91 - UNSPECIFIED ATRIAL FIBRILLATION   Status: Resolved   


Plan: 


-presented with a fib RVR and spontaneously converted to NSR


-cont multaq george and metoprolol PRN 


-continues to be in NSR 








(5) Hypoalbuminemia due to protein-calorie malnutrition


Code(s): E46 - UNSPECIFIED PROTEIN-CALORIE MALNUTRITION   Status: Chronic   


Plan: 





-started on albumin q 6 hrs by pulm


-decrease fluids to half 








(6) Edema due to hypoalbuminemia


Code(s): R60.9 - EDEMA, UNSPECIFIED; E88.09 - OTH DISORDERS OF PLASMA-PROTEIN 

METABOLISM, NEC   Status: Chronic   


Plan: 


albumin q6hrs


-subsequent poor urinary output 








(7) History of pulmonary embolism


Code(s): Z86.711 - PERSONAL HISTORY OF PULMONARY EMBOLISM   Status: Chronic   


Plan: 


s/p IVC filter


- consider PPX lovenox 


-bedside sono yesterdaydid not show DVT in BLE femoral or popliteal veins. 








(8) BPH (benign prostatic hyperplasia)


Code(s): N40.0 - BENIGN PROSTATIC HYPERPLASIA WITHOUT LOWER URINRY TRACT SYMP   

Status: Chronic   


Plan: 


-chronic indwelling catheter.








(9) Alcohol abuse


Code(s): F10.10 - ALCOHOL ABUSE, UNCOMPLICATED   Status: Chronic   





(10) Blindness


Code(s): H54.7 - UNSPECIFIED VISUAL LOSS   Status: Chronic   





(11) Macrocytic anemia


Code(s): D53.9 - NUTRITIONAL ANEMIA, UNSPECIFIED   Status: Chronic   





(12) Malnourished


Code(s): E46 - UNSPECIFIED PROTEIN-CALORIE MALNUTRITION   Status: Chronic   





(13) Retroperitoneal hematoma


Code(s): K66.1 - HEMOPERITONEUM   Status: Resolved   


Plan: 


-occured during recent hospitalization after being placed on heparin for Jeffery PE'

s and then IVC filter placed. 


-hgb improved and stable. 








<Shobha Cuellar - Last Filed: 05/30/18 09:43>





Attending Addendum





- Attending Addendum


Date/Time: 05/30/18 1511





I personally evaluated the patient and discussed the management with Dr. Cuellar 

and team.


I agree with and repeated the History, Examination, Assessment and Plan 

documented above with any addition or exceptions noted below.





Sedation vacations, lung protective ventilation, begin feeds, diurese and I 

would hold fluids and albumin, trend h/h I >> O at this point and low suspicion 

of bleeding and will hold dvt ppx.  Guarded prognosis.








<Alden Cook - Last Filed: 05/30/18 15:12>

## 2018-05-31 VITALS — SYSTOLIC BLOOD PRESSURE: 131 MMHG | DIASTOLIC BLOOD PRESSURE: 74 MMHG

## 2018-05-31 VITALS — TEMPERATURE: 99.1 F

## 2018-05-31 LAB
ALBUMIN SERPL BCG-MCNC: 3.2 G/DL (ref 3.4–4.8)
ALP SERPL-CCNC: 41 U/L (ref 40–150)
ALT SERPL W P-5'-P-CCNC: 9 U/L (ref 8–55)
ANION GAP SERPL CALC-SCNC: 10 MMOL/L (ref 10–20)
AST SERPL-CCNC: 21 U/L (ref 5–34)
BASOPHILS # BLD AUTO: 0 THOU/UL (ref 0–0.2)
BASOPHILS NFR BLD AUTO: 0 % (ref 0–1)
BILIRUB SERPL-MCNC: 1.1 MG/DL (ref 0.2–1.2)
BUN SERPL-MCNC: 7 MG/DL (ref 8.4–25.7)
CALCIUM SERPL-MCNC: 8.6 MG/DL (ref 7.8–10.44)
CHLORIDE SERPL-SCNC: 99 MMOL/L (ref 98–107)
CO2 SERPL-SCNC: 34 MMOL/L (ref 23–31)
CREAT CL PREDICTED SERPL C-G-VRATE: 108 ML/MIN (ref 70–130)
EOSINOPHIL # BLD AUTO: 0 THOU/UL (ref 0–0.7)
EOSINOPHIL NFR BLD AUTO: 0.2 % (ref 0–10)
GLOBULIN SER CALC-MCNC: 2.1 G/DL (ref 2.4–3.5)
GLUCOSE SERPL-MCNC: 97 MG/DL (ref 83–110)
HGB BLD-MCNC: 8 G/DL (ref 14–18)
LYMPHOCYTES # BLD: 0.4 THOU/UL (ref 1.2–3.4)
LYMPHOCYTES NFR BLD AUTO: 5.5 % (ref 21–51)
MCH RBC QN AUTO: 33.9 PG (ref 27–31)
MCV RBC AUTO: 106 FL (ref 80–94)
MONOCYTES # BLD AUTO: 0.7 THOU/UL (ref 0.11–0.59)
MONOCYTES NFR BLD AUTO: 9.9 % (ref 0–10)
NEUTROPHILS # BLD AUTO: 6.3 THOU/UL (ref 1.4–6.5)
NEUTROPHILS NFR BLD AUTO: 84.4 % (ref 42–75)
PLATELET # BLD AUTO: 142 THOU/UL (ref 130–400)
POTASSIUM SERPL-SCNC: 4 MMOL/L (ref 3.5–5.1)
RBC # BLD AUTO: 2.35 MILL/UL (ref 4.7–6.1)
SODIUM SERPL-SCNC: 139 MMOL/L (ref 136–145)
WBC # BLD AUTO: 7.5 THOU/UL (ref 4.8–10.8)

## 2018-05-31 RX ADMIN — Medication SCH ML: at 09:30

## 2018-05-31 NOTE — PDOC.EVN
Event Note





- Event Note


Event Note: 





Withdrawal of care/extubation occurred at 2002 on 5/31/18.  





Residents paged as patient was PEA at approximately 2045 and presented to 

bedside immediately.  





Time of death was 2048.  Family present in room at time of death.  Patient 

evaluated.  No pulse, no breath sounds, no response to painful stimuli.  





Please see full death summary dictation.

## 2018-05-31 NOTE — RAD
PORTABLE CHEST:

 

Date:  05/31/18 

 

HISTORY:  

Dyspnea. CCU follow-up. 

 

COMPARISON:  

05/30/18. 

 

FINDINGS/IMPRESSION: 

Lungs appear well aerated. Evidence of small bilateral effusions. No infiltrate or vascular congestio
n. ET tube and NG tube remain in position. No acute interval change from yesterday. 

 

 

POS: [facility]

## 2018-05-31 NOTE — PDOC.FM
- Subjective


Subjective: 





patient is intubated and sedated. Spoke with his family members yesterday and 

they are trying to get his 2 sons here prior to extubation. Family reportedly 

to arrive sometime this morning and expect to proceed with terminal extubation 

as patient does not desire trach. yesterday his support on ventilator was 

reduced and patient did not fair well with this. Has very small < 200 ml tidal 

volumes and become hypoxic. 





- Objective


MAR Reviewed: Yes


Vital Signs & Weight: 


 Vital Signs (12 hours)











  Temp Pulse Resp BP


 


 05/31/18 06:36   90  


 


 05/31/18 04:00  98.3 F   


 


 05/31/18 02:40   97   131/74


 


 05/31/18 00:59   84   116/65


 


 05/30/18 22:17   104 H   110/65


 


 05/30/18 22:00    12 








 Weight











Admit Weight                   63.957 kg


 


Weight                         76.1 kg











 Most Recent Monitor Data











Heart Rate from ECG            78


 


NIBP                           122/74


 


NIBP BP-Mean                   90


 


Respiration from ECG           8


 


SpO2                           100














I&O: 


 











 05/30/18 05/31/18 06/01/18





 06:59 06:59 06:59


 


Intake Total 4271.5 2455.3 


 


Output Total 2307 1290 


 


Balance 1964.5 1165.3 











Result Diagrams: 


 05/31/18 03:38





 05/31/18 03:38





<Shobha Cuellar - Last Filed: 05/31/18 08:48>





- Objective


Vital Signs & Weight: 


 Vital Signs (12 hours)











  Temp Pulse Resp


 


 05/31/18 14:38   117 H 


 


 05/31/18 14:00    15


 


 05/31/18 12:32   92 


 


 05/31/18 12:00    12


 


 05/31/18 11:00  98.5 F  


 


 05/31/18 10:33   75 


 


 05/31/18 08:00  98 F  75  15


 


 05/31/18 06:36   90 


 


 05/31/18 04:00  98.3 F  








 Weight











Admit Weight                   63.957 kg


 


Weight                         76.1 kg











 Most Recent Monitor Data











Heart Rate from ECG            121


 


NIBP                           113/78


 


NIBP BP-Mean                   85


 


Respiration from ECG           15


 


SpO2                           100














I&O: 


 











 05/30/18 05/31/18 06/01/18





 06:59 06:59 06:59


 


Intake Total 4271.5 2455.3 60


 


Output Total 2307 1290 1335


 


Balance 1964.5 1165.3 -1275











Result Diagrams: 


 05/31/18 03:38





 05/31/18 03:38





<Alden Cook - Last Filed: 05/31/18 14:54>





Phys Exam





- Physical Examination


Respiratory: no wheezing


rhales bilaterally 


Cardiovascular: RRR, no significant murmur


Gastrointestinal: soft


3+ pitting BLE edema


sedated 


Skin: no rash





<Shobha Cuellar - Last Filed: 05/31/18 08:48>





Dx/Plan


(1) Acute on chronic respiratory failure with hypercapnia


Code(s): J96.22 - ACUTE AND CHRONIC RESPIRATORY FAILURE WITH HYPERCAPNIA   

Status: Acute   


Plan: 


- moved to ICU and intubated on 5/28 after CO2 on ABG of 116 with agonal 

breathing


-Did not tolerate trial of weaning yesterday. Do not expect this to improve due 

to his overall severe muscle loss.


-Family moving towards terminal extubation  








(2) COPD with exacerbation


Code(s): J44.1 - CHRONIC OBSTRUCTIVE PULMONARY DISEASE W (ACUTE) EXACERBATION   

Status: Acute   


Plan: 


oral steroids. 


will hold off on abx, procal wnl.


inhaled steroids 


cont duonebs











(3) Acute anemia


Code(s): D64.9 - ANEMIA, UNSPECIFIED   Status: Acute   


Plan: 


stable, do not suspect underlying new bleed. Likely due to drawing fluid into 

intravascular space s/p albumin


no evidence of hemolysis. No gross bleed. 








(4) Atrial fibrillation with RVR


Code(s): I48.91 - UNSPECIFIED ATRIAL FIBRILLATION   Status: Resolved   


Plan: 


-presented with a fib RVR and spontaneously converted to NSR


-cont multaq george and metoprolol PRN 


-continues to be in NSR 








(5) Hypoalbuminemia due to protein-calorie malnutrition


Code(s): E46 - UNSPECIFIED PROTEIN-CALORIE MALNUTRITION   Status: Chronic   


Plan: 





-cont gentle fluids, if not extubated today will initiate feeds. 








(6) Edema due to hypoalbuminemia


Code(s): R60.9 - EDEMA, UNSPECIFIED; E88.09 - OTH DISORDERS OF PLASMA-PROTEIN 

METABOLISM, NEC   Status: Chronic   


Plan: 


albumin q6hrs


-subsequent poor urinary output 








(7) History of pulmonary embolism


Code(s): Z86.711 - PERSONAL HISTORY OF PULMONARY EMBOLISM   Status: Chronic   


Plan: 


s/p IVC filter


- consider PPX lovenox 


-bedside sono did not show DVT in BLE femoral or popliteal veins. 








(8) BPH (benign prostatic hyperplasia)


Code(s): N40.0 - BENIGN PROSTATIC HYPERPLASIA WITHOUT LOWER URINRY TRACT SYMP   

Status: Chronic   


Plan: 


-chronic indwelling catheter.








(9) Alcohol abuse


Code(s): F10.10 - ALCOHOL ABUSE, UNCOMPLICATED   Status: Chronic   





(10) Blindness


Code(s): H54.7 - UNSPECIFIED VISUAL LOSS   Status: Chronic   





(11) Macrocytic anemia


Code(s): D53.9 - NUTRITIONAL ANEMIA, UNSPECIFIED   Status: Chronic   





(12) Malnourished


Code(s): E46 - UNSPECIFIED PROTEIN-CALORIE MALNUTRITION   Status: Chronic   





(13) Retroperitoneal hematoma


Code(s): K66.1 - HEMOPERITONEUM   Status: Resolved   


Plan: 


-occured during recent hospitalization after being placed on heparin for Jeffery PE'

s and then IVC filter placed. 


-hgb improved and stable. 








- Plan


Plan: 





Overall very poor prognosis, would be appropriate for extubation and do not 

suspsect he will live long after. If so, consider inpatient hospice. 





<Shobha Cuellar - Last Filed: 05/31/18 08:48>





Attending Addendum





- Attending Addendum


Date/Time: 05/31/18 5029





I personally evaluated the patient and discussed the management with Dr. Cuellar.


I agree with and repeated the History, Examination, Assessment and Plan 

documented above with any addition or exceptions noted below.





Limited by intubation and sedation.  Follows commands, moves all extremities.  

Diffuse rhonchi.  Poor air movement.  RRR s M.  Distal edema.





Acute hypoxic/hypercapneic respiratory failure.  Poor prognosis and pulling 

very small TV's.  Continue pulmonary toilet.  Await family for discussion of 

extubation and withdrawal of care.  Hold any feeds.  Continue managment of 

comorbidities pending family decision.





<Alden Cook - Last Filed: 05/31/18 14:54>

## 2018-05-31 NOTE — PRG
DATE OF SERVICE:  05/31/2018

 

OBJECTIVE:

VITAL SIGNS:  Mr. Esteves has heart rate of 90, blood pressure 122/74, respiratory rate is in the 20s
.

GENERAL:  His pressure support breaths are small.

LUNGS:  Remarkable for distant breath sounds.

HEART:  Regular rhythm.

ABDOMEN:  Soft.

EXTREMITIES:  Without asymmetry.

 

IMAGING:  Chest radiograph essentially unchanged.  It is a poor quality film.

 

Intake and output is positive 1165.

 

LABORATORY DATA:  White count 7.5, hemoglobin 8.0, platelets 142.  Sodium 139, potassium 4, chloride 
99, bicarbonate 34, BUN 7, creatinine 0.62.

 

IMPRESSION:  Multiorgan dysfunction with extreme cachexia combined with advanced obstructive lung dis
ease.  Family is contemplating withdrawal of support today and comfort measures.  If he is extubated,
 I would not anticipate he would survive very long.

 

Critical care time was 30 minutes.

 

ADDENDUM:  I will review medications.  Medicines will be simplified.  Sedation protocol can be discon
tinued when he is extubated.

## 2018-06-01 NOTE — DS-2
ATTENDING PHYSICIAN:  Dr. Alden Cook and Dr. Georgiana Castro  

 

RESIDENT:  Dr. Shobha Cuellar

 

DATE OF ADMISSION:  05/27/2018

 

DATE OF DEATH:  05/31/2018

 

TIME OF DEATH:  2048.

 

CAUSE OF DEATH:  Acute on chronic respiratory failure secondary to advanced 
chronic obstructive pulmonary disease with exacerbation.

 

SECONDARY DIAGNOSES:

1.  Severe muscle wasting.

2.  Atrial fibrillation with rapid ventricular response.

3.  Hypoalbuminemia due to protein calorie malnutrition.

4.  Edema secondary to hypoalbuminemia.

5.  History of recent pulmonary embolism.

6.  Benign prostatic hyperplasia.

7.  Alcohol abuse.

8.  Blindness.

9.  Macrocytic anemia.

10.  Recent retroperitoneal hematoma.

11.  Chronic hepatitis B infection.

 

HOSPITAL COURSE:  This is a 77-year-old male with the above history who 
presented to the ER with complaints of difficulty breathing.  The patient had 
been recently discharged from the hospital requiring oxygen.  The patient was 
monitored and supported with oxygen in the hospital.  At the time of admission 
the patient had new onset atrial fibrillation with rapid ventricular response 
and was treated with diltiazem and this was thought to be a part of his acute 
difficulty with breathing.  The patient spontaneously converted on a diltiazem 
drip and was transitioned over to Multaq by Cardiology recommendation.  The 
patient remained in sinus rhythm and was rate controlled.  However, on day 2 of 
his hospitalization, he had a Code Green called and was in acute worsened acute 
on chronic respiratory failure with a CO2 on ABG of 116.  The patient was 
minimally responsive and had agonal breathing.  The patient was a full code at 
that time and was asked again if he want to be intubated and agreed.  His wife 
was called and she stated to proceed with intubation of the patient.  The next 
day, the family decided that they wanted to make the patient DNR and also all 
agreed that he would not want a tracheostomy.  The patient did awake on 
sedation holiday and agreed with the decision that he would not want a 
tracheostomy. Trial of weaning occurred on day 2 of intubation and the patient 
became hypoxic with worsened hypercapnia upon this a slight weaning trial. 
Therefore, decision was made to withdraw care as the patient was too weak to be 
weaned off the ventilator. As the patient had severe muscle wasting, it was not 
thought that this is an issue that would improve over time.  Discussed comfort 
measures and extubation with family who were all in agreement.  On the day of 
extubation, the family was at bedside and the patient by shaking his head 
communicated that he did not want a tracheostomy and wanted to be extubated at 
that time.  After family arrived at the bedside the patient did have terminal 
extubation at 2002 on 05/31/2018 and time of death was 2048.  This had all been 
discussed with patient's wife as well.  She states she was too ill to come back 
up to the hospital for withdrawal of care.  However, stated she understood the 
plan and was okay with following through with it.

 

In regards to patient's other medical conditions throughout the hospital stay, 
the patient did have poor urinary output and was thought to be severely 
dehydrated at the time of admission and was aggressively rehydrated and did 
have improved urinary output.  The patient did have pretty severe 
hypoalbuminemia and therefore relatively severe edema.  The patient was started 
on albumin in the early stages of his hospitalization which did seem to help, 
but secondary to severe malnutrition this was not thought to be a sustainable 
treatment.  The patient's other laboratory studies were relatively within 
normal limits outside of his severe respiratory issues.

 

Thank you for allowing us to take part in the care of this patient.

 

JOHN

## 2018-06-03 NOTE — EKG
Test Reason : STAT

Blood Pressure : ***/*** mmHG

Vent. Rate : 123 BPM     Atrial Rate : 123 BPM

   P-R Int : 130 ms          QRS Dur : 072 ms

    QT Int : 282 ms       P-R-T Axes : 073 087 095 degrees

   QTc Int : 403 ms

 

Sinus tachycardia

Low voltage QRS

Nonspecific T wave abnormality

Abnormal ECG

When compared with ECG of 27-MAY-2018 04:29,

Sinus rhythm has replaced Atrial fibrillation

Confirmed by DEMOND MARROQUIN ([id number]) on 6/3/2018 11:40:49 PM

 

Referred By:  SUNITA           Confirmed By:DEMOND MARROQUIN

## 2018-06-07 NOTE — PQF
LIGIA PARKS JR                                            LONNIE DORSEYr

[id number]                                                             [facility]

[id number]                             

                                   

CLINICAL DOCUMENTATION CLARIFICATION FORM:  POST DISCHARGE



Addendum to original discharge summary date:  __________________________________
____



Late entry note date:  _________________________________________________________
__















Please exercise your independent, professional judgment in responding to the 
clarification form. 

Clinical indicators are provided on the bottom of this form for your review



Diagnosis: Acute on Chronic Respiratory Failure





Present on Admission (POA):  [ x ] Yes             [  ] No             [  ] 
Unable to determine



 





Coding guidelines require hospitals to identify whether a diagnosis was present 
on admission (POA) or not. To accurately assign the appropriate POA indicator, 
this information must be clearly documented within the medical record. 







CLINICAL INDICATORS - SIGNS / SYMPTOMS / LABS   



ER:  Resp 18  29

        O2 sat 95  100% on 4LNC

ER:  c/o SOB difficulty breathing  uses 4LNC oxygen at home

        Breath sounds  wheezing; increased resp effort; labored; converses in 
short phrases;

 

H&P:  patient felt he wasnt getting enough oxygen at home

            Physical exam:  no resp distress

 

5-27 CXR:  hyperinflation- emphysema  w/ small bilateral pleural effusions

 

RISKS;

H&P:  COPD on 4LNC oxygen at home

            New onset A-Fib

            HX PE

 

ER:  abuses marijuana  per patient since 1961

 

TREATMENT

:  Oxygen 4LNC

                         Cardizem drip for new onset A-Fib

 

        















(This form is maintained as a part of the permanent medical record)

2015 [employer].  All Rights Reserved

[email or web address]    [phone]

                                                              

JOHN

## 2023-09-12 NOTE — DIS-2
TRANSITION OF CARE

 

DATE OF ADMISSION:  2018

 

ADMITTING ATTENDING:  Dr. Georgiana Castro.

 

RESIDENT:  Dr. Shobha Cuellar.

 

CONSULTATIONS:  Made to;

1.  Dr. Osbaldo Tavarez of Urology.

2.  Dr. Cristobal of Pulmonology.

3.  Dr. Henderson of Infectious Disease.

4.  Dr. Xavier Rice of CV Surgery.

 

PRIMARY DIAGNOSES AT TIME OF TRANSITION SUMMARY:

1.  Acute on chronic hypoxic respiratory failure.

2.  Acute blood loss anemia on macrocytic anemia secondary to retroperitoneal 
hematoma.

3.  Retroperitoneal hematoma.

4.  Bilateral pulmonary embolism.

5.  Chronic obstructive pulmonary disease.

6.  Chronic hepatitis B.

7.  Severe BPH.

8.  Acute kidney injury/resolved.

9.  Malnourished.

10.  Blindness.

11.  Sepsis bacteremia secondary to Providencia rettgeri.

12.  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

13.  Alcohol abuse.

14.  Gallbladder sludge.

15.  Transaminitis.

16.  Thrombocytopenia.

17.  Hyperkalemia.

 

PERTINENT IMAGIN.  2018- CXR- no evidence of acute cardiopulmonary disease.

2.  2018- abdomen and pelvis CT which showed some mild generalized fat 
stranding within the fat slightly more prominent than the ascending colon 
region.  There was some air density along the right colon wall.  Possibility of 
colitis should be considered.  There is also distended bladder with an enlarged 
prostate.  There is mild gallbladder distention.  An emphysematous lung change.

3.  Abdominal ultrasound- 05/10/2018, which showed echogenic material in the 
lumen of the gallbladder, but cannot rule out a sludge versus mucosal based 
mass lesion.

4.  abdominal MRI- 2018 which showed material within the gallbladder that 
likely represent either sludge or non-shadowing stones.  No enhancement is seen 
to suggest an enhancing mass.  There are tiny bilateral renal cysts.

5. echocardiogram- 2018.  This showed overall left ventricular function 
was normal.  Ejection fraction was visually estimated at 50%-55%.  Right 
ventricle global systolic function, moderately reduced.  Moderately enlarged 
right ventricle cavity.  Mild mitral regurgitation present and mild tricuspid 
regurgitation.

6. knee x-ray- 05/10/2018.  This showed severe arthritic changes of the knee.

7. CXR- 2018, which showed suboptimal evaluation of the medial left lung 
base due to overlying cardiac silhouette.  Mild patchy increased density.  
Atelectasis versus pneumonia at the left lung base is suggestion, follow up PA 
and lateral chest x-ray recommended.

8.  Chest CTA- 2018, which showed pulmonary emboli seen within the 
segmental pulmonary branches of the lower lobes bilaterally.  There is a 
prominence of the right ventricle and right atrium with reflux of contrast into 
the hepatic veins, which can be seen with right heart strain.  There is also 
interval development of mild to moderate bilateral pleural effusions that have 
developed since 2018.  There is severe emphysema and mild aneurysmal 
dilatation of the aortic arch measuring 3.1 cm.

9.  venogram- 2018, which showed no evidence of DVT in bilateral lower 
extremities.

10. abdomen and pelvis CT- 05/15/2018 which showed a large right 
retroperitoneal hematoma involving the iliacus muscle and the right posterior 
perirenal space extending down to the pelvis.  There is a focus of active 
extravasation within the right iliac muscle.  There is extensive third spacing 
of fluid.  There is absence of contrast enhancement of the bilateral femoral 
arteries, although there is contrast in the deep femoral arteries.  A CT 
angiogram may be beneficial to evaluate for vascular occlusion versus phase of 
contrast.

11.CXR-  2018.  This showed COPD type changes and small bilateral pleural 
effusions, left larger than right.  There was an echocardiogram done on 2018.  This showed ejection fraction estimated to be 60%-65%.  Mild LVH.  
Mildly dilated RV and reduced RV systolic function.  Mild enlarged right atrium 
size.  Mild mitral regurgitation.  Calcified aortic valve with decreased cusp 
opening.  Mild aortic stenosis.  Mild tricuspid regurgitation.  Moderate-sized 
pericardial effusion without tamponade.

12. 2018- repeat echocardiogram performed secondary to acute hypoxic 
respiratory failure.  This showed ejection fraction visually estimated at 
greater than 60%-65%.  Normal size of the right ventricle cavity.  Left atrium 
of normal size.  Mitral annular calcification present.  Aortic valve 
calcification.  Mild tricuspid regurgitation.  Small trivial pericardial 
effusion and a large pleural effusion.

 

HISTORY OF PRESENT ILLNESS/HOSPITAL COURSE:  This is a 77-year-old male with a 
past medical history of hypertension as well as severe BPH witH chronic 
indwelling catheter, who presented to the ER after having hematuria at home.  
The patient was found to have low blood pressures upon admission of 88/50 and 
these improved to 100 systolic after 4 liters of normal saline.  The patient 
had an elevated lactic acid at that time, which improved over the course of 
this hospital stay, status post fluids.  The patient was admitted to the [facility] 
at that time, and was treated for urosepsis with vancomycin and Zosyn as well 
as Cipro secondary to a recently treated urinary tract infection which was 
sensitive to Cipro.  The patient's urine and blood cultures grew out 
respectively Sphingobacterium and Providencia rettgeri. Dr. Henderson of Infectious 
Disease was consulted and recommended continuing the patient on Cipro p.o. for 
an additional 2 weeks.  The patient was also at admission found to have a 
transaminitis and routine hepatitis panels were done and patient was found to 
have a chronic hepatitis B infection.  Hepatitis B DNA was obtained and the 
patient is to follow up with Dr. Henderson in the outpatient setting to consider 
having any treatment done.

 

In regard to sepsis and urinary tract infection, the patient was ready for 
discharge on approximately 2018; however, upon the time of discharge, the 
patient sat up and became noticeably dyspneic as well as tachycardic and 
diaphoretic, and a workup for a pulmonary embolism was completed and found to 
have bilateral pulmonary emboli as described above.  It is to be noted that at 
the time of admission, the patient had a severe thrombocytopenia with platelets 
as low as 42 and therefore, the decision was made to withhold Lovenox.  After 
finding a pulmonary embolism.  The patient was placed on a heparin drip and 
surprisingly, the patient's platelets improved to as high as 250.  The patient 
is thought to be a very poor candidate for anticoagulation and in fact likely 
contraindicated secondary to the fact that he is blind and has had multiple 
falls within the last year.  Therefore, it was discussed and the patient 
underwent an IVC filter placement by Dr. Xavier Rice.  Late on the day after 
his procedure for the IVC filter, patient's hemoglobin was noted to have 
dropped from 9.9-8.2.  The patient was experiencing some abdominal pain at that 
time and patient was sent for an urgent abdominal CT scan which did show the 
large retroperitoneal bleed as noted above.  The patient's heparin had already 
been stopped at this point and patient's hemoglobin was trended with the plan 
to check it every 4 hours and transfuse for hemoglobin less than 7 unless the 
patient became symptomatic.  Upon this finding, CV Surgery as well as General 
Surgery were both notified and requested recommendations, both stated that 
surgery would not be an option at this point for such a hematoma and 
recommended the checking hemoglobin and transfusing as needed.  The patient 
seemed to stabilize after the first unit of blood and his hemoglobin jumped 
around from 7.6-7.9 over the course of several days.  It was thought that the 
bleed was likely resolved.  Patient at this point had visibly noticeable edema 
in bilateral lower extremities which to some degree patient did have at the 
time of admission.  However, seemed to have worsened and decision was made to 
attempt diuresis.  The patient responded very well to diuresis and put off 
approximately 5.5 liters over the course of 2 days.  At this point, patient's 
hemoglobin dropped down to 7.1 and a transfusion was done of 1 unit of packed 
red blood cells on 2018 and patient's hemoglobin improved from 7.1-8.9.  
The following day, the patient's hemoglobin was 8.3, which is on the day of 
this dictation.  It was thought that the patient may be a candidate to be 
discharged today; however, the patient was requiring 2 liters of nasal cannula 
and the patient was preferentially lying flat in bed and did not like to be sat 
up.  The patient was encouraged to sit up so that he would be able to handle 
his drive home, but upon sitting up and being checked on patient was noticed to 
be in some respiratory distress and his oxygen had fallen off since and patient 
had a large amount of phlegm that he was unable to completely expectorate.  
Therefore, a suction was obtained and upon suctioning of the phlegm, patient 
improved significantly.  Decision was made to go ahead and monitor patient for 
at least an additional 24 hours.

 

In regard to patient's elevated troponin at the time of admission, it was 
thought that elevated troponins were likely secondary to demand ischemia as 
patient had had significantly low blood pressures upon the time of admission.  
The patient's troponins did improve and in fact on the day of the pulmonary 
embolism finding, his troponins were negative.  In light of all this, patient 
was worked up with an echo, which did not have reduced ejection fraction as 
noticed above and had no evidence for heart failure.  The patient did not 
experience chest pain at any point throughout his hospital stay; however, he 
did occasionally complain of shortness of breath.  It is to be noted that 
patient has an extensive smoking history and has fairly impressive COPD changes 
on his CT as well as x-ray.

 

Following patient's acute blood loss, he did have a slight episode of acute 
kidney injury which resolved fairly quickly after a couple of fluid boluses and 
blood transfusion.

 

Of note upon admission, the patient had a transaminitis as well as a low 
platelet count with a normal INR and this did seem to improve throughout his 
hospital stay with normal AST, ALT values and platelet count normalized as well.

 

It is noted that the patient had very poor nutrition status and refused to eat 
through most of his hospitalization.  He did accept some Ensure and was 
continued on this.  The patient has severe deconditioning and was recommended 
to do physical therapy; however, the patient declined doing physical therapy 
and therefore physical therapy signed off.

 

While it would have been optimal to have patient up in a bedside chair at 
minimum, it was thought that this was too unsafe secondary to his blindness and 
high risk for fall.

 

Patient has severe BPH with an enlarged prostate and his outpatient management 
by Dr. Laguna, notes were obtained and it was noted that management was to 
continue with a chronic indwelling catheter.  Therefore, patient was to go home 
on a chronic indwelling catheter.

 

Patient's condition as of today is stable following the episode with mucus and 
phlegm production.  Of note, there was a repeat echocardiogram done today as 
there was concern for a pericardial effusion, which was not significant on 
echocardiogram.  The patient did note to have pleural effusions on both chest x-
ray and echocardiogram and therefore the patient was given additional diuretics.

 

Please see next resident management to continue this hospitalization.

 

JOHN Yes Yes Yes Yes Yes Yes